# Patient Record
Sex: MALE | Race: WHITE
[De-identification: names, ages, dates, MRNs, and addresses within clinical notes are randomized per-mention and may not be internally consistent; named-entity substitution may affect disease eponyms.]

---

## 2017-03-16 ENCOUNTER — HOSPITAL ENCOUNTER (OUTPATIENT)
Dept: HOSPITAL 8 - CFH | Age: 72
Discharge: HOME | End: 2017-03-16
Attending: INTERNAL MEDICINE
Payer: MEDICARE

## 2017-03-16 DIAGNOSIS — N28.1: Primary | ICD-10-CM

## 2017-03-16 DIAGNOSIS — I10: ICD-10-CM

## 2017-03-16 DIAGNOSIS — N17.9: ICD-10-CM

## 2017-03-16 DIAGNOSIS — R80.3: ICD-10-CM

## 2017-03-16 DIAGNOSIS — N32.0: ICD-10-CM

## 2017-03-16 DIAGNOSIS — I48.2: ICD-10-CM

## 2017-03-16 PROCEDURE — 76770 US EXAM ABDO BACK WALL COMP: CPT

## 2017-03-27 ENCOUNTER — HOSPITAL ENCOUNTER (OUTPATIENT)
Dept: HOSPITAL 8 - CFH | Age: 72
Discharge: HOME | End: 2017-03-27
Attending: FAMILY MEDICINE
Payer: MEDICARE

## 2017-03-27 DIAGNOSIS — I10: ICD-10-CM

## 2017-03-27 DIAGNOSIS — I99.9: Primary | ICD-10-CM

## 2017-03-27 LAB
AST SERPL-CCNC: 22 U/L (ref 15–37)
BUN SERPL-MCNC: 18 MG/DL (ref 7–18)

## 2017-03-27 PROCEDURE — 36415 COLL VENOUS BLD VENIPUNCTURE: CPT

## 2017-03-27 PROCEDURE — 80061 LIPID PANEL: CPT

## 2017-03-27 PROCEDURE — 80053 COMPREHEN METABOLIC PANEL: CPT

## 2017-04-13 ENCOUNTER — HOSPITAL ENCOUNTER (OUTPATIENT)
Dept: HOSPITAL 8 - CFH | Age: 72
Discharge: HOME | End: 2017-04-13
Attending: INTERNAL MEDICINE
Payer: MEDICARE

## 2017-04-13 DIAGNOSIS — I51.7: ICD-10-CM

## 2017-04-13 DIAGNOSIS — I10: ICD-10-CM

## 2017-04-13 DIAGNOSIS — I08.3: Primary | ICD-10-CM

## 2017-04-13 DIAGNOSIS — Z86.73: ICD-10-CM

## 2017-04-13 PROCEDURE — 93306 TTE W/DOPPLER COMPLETE: CPT

## 2017-04-17 ENCOUNTER — HOSPITAL ENCOUNTER (OUTPATIENT)
Dept: HOSPITAL 8 - CFH | Age: 72
Discharge: HOME | End: 2017-04-17
Attending: NURSE PRACTITIONER
Payer: MEDICARE

## 2017-04-17 DIAGNOSIS — I48.2: Primary | ICD-10-CM

## 2017-04-17 DIAGNOSIS — Z79.01: ICD-10-CM

## 2017-04-17 PROCEDURE — 36415 COLL VENOUS BLD VENIPUNCTURE: CPT

## 2017-04-17 PROCEDURE — 85610 PROTHROMBIN TIME: CPT

## 2017-04-18 ENCOUNTER — HOSPITAL ENCOUNTER (OUTPATIENT)
Dept: HOSPITAL 8 - CFH | Age: 72
Discharge: HOME | End: 2017-04-18
Attending: INTERNAL MEDICINE
Payer: MEDICARE

## 2017-04-18 DIAGNOSIS — I48.2: Primary | ICD-10-CM

## 2017-04-18 DIAGNOSIS — I10: ICD-10-CM

## 2017-04-18 LAB — BUN SERPL-MCNC: 30 MG/DL (ref 7–18)

## 2017-04-18 PROCEDURE — 36415 COLL VENOUS BLD VENIPUNCTURE: CPT

## 2017-04-18 PROCEDURE — 80048 BASIC METABOLIC PNL TOTAL CA: CPT

## 2017-04-18 PROCEDURE — 80162 ASSAY OF DIGOXIN TOTAL: CPT

## 2017-05-22 ENCOUNTER — HOSPITAL ENCOUNTER (OUTPATIENT)
Dept: HOSPITAL 8 - LAB | Age: 72
Discharge: HOME | End: 2017-05-22
Attending: NURSE PRACTITIONER
Payer: MEDICARE

## 2017-05-22 DIAGNOSIS — Z79.01: ICD-10-CM

## 2017-05-22 DIAGNOSIS — I48.2: Primary | ICD-10-CM

## 2017-05-22 PROCEDURE — 85610 PROTHROMBIN TIME: CPT

## 2017-05-22 PROCEDURE — 36415 COLL VENOUS BLD VENIPUNCTURE: CPT

## 2017-06-26 ENCOUNTER — HOSPITAL ENCOUNTER (OUTPATIENT)
Dept: HOSPITAL 8 - CFH | Age: 72
Discharge: HOME | End: 2017-06-26
Attending: NURSE PRACTITIONER
Payer: MEDICARE

## 2017-06-26 DIAGNOSIS — I48.2: ICD-10-CM

## 2017-06-26 DIAGNOSIS — Z79.01: ICD-10-CM

## 2017-06-26 DIAGNOSIS — I50.9: Primary | ICD-10-CM

## 2017-06-26 PROCEDURE — 36415 COLL VENOUS BLD VENIPUNCTURE: CPT

## 2017-06-26 PROCEDURE — 85610 PROTHROMBIN TIME: CPT

## 2017-07-31 ENCOUNTER — HOSPITAL ENCOUNTER (OUTPATIENT)
Dept: HOSPITAL 8 - LAB | Age: 72
Discharge: HOME | End: 2017-07-31
Attending: INTERNAL MEDICINE
Payer: MEDICARE

## 2017-07-31 DIAGNOSIS — Z79.01: ICD-10-CM

## 2017-07-31 DIAGNOSIS — I48.2: ICD-10-CM

## 2017-07-31 DIAGNOSIS — I50.9: Primary | ICD-10-CM

## 2017-07-31 PROCEDURE — 36415 COLL VENOUS BLD VENIPUNCTURE: CPT

## 2017-07-31 PROCEDURE — 85610 PROTHROMBIN TIME: CPT

## 2017-08-23 ENCOUNTER — HOSPITAL ENCOUNTER (OUTPATIENT)
Dept: HOSPITAL 8 - LAB | Age: 72
Discharge: HOME | End: 2017-08-23
Attending: INTERNAL MEDICINE
Payer: MEDICARE

## 2017-08-23 DIAGNOSIS — I12.9: ICD-10-CM

## 2017-08-23 DIAGNOSIS — R80.3: ICD-10-CM

## 2017-08-23 DIAGNOSIS — N18.3: ICD-10-CM

## 2017-08-23 DIAGNOSIS — I48.2: Primary | ICD-10-CM

## 2017-08-23 LAB
AST SERPL-CCNC: 16 U/L (ref 15–37)
BUN SERPL-MCNC: 20 MG/DL (ref 7–18)
HCT VFR BLD CALC: 49 % (ref 39.2–51.8)
HGB BLD-MCNC: 16 G/DL (ref 13.7–18)
PATH.CAST-FLAG: (no result)
SPERM-FLAG: (no result)
SRC-FLAG: (no result)
WBC # BLD AUTO: 8.5 X10^3/UL (ref 3.4–10)
XTAL-FLAG: (no result)
YLC-FLAG: (no result)

## 2017-08-23 PROCEDURE — 81001 URINALYSIS AUTO W/SCOPE: CPT

## 2017-08-23 PROCEDURE — 82570 ASSAY OF URINE CREATININE: CPT

## 2017-08-23 PROCEDURE — 36415 COLL VENOUS BLD VENIPUNCTURE: CPT

## 2017-08-23 PROCEDURE — 80053 COMPREHEN METABOLIC PANEL: CPT

## 2017-08-23 PROCEDURE — 85025 COMPLETE CBC W/AUTO DIFF WBC: CPT

## 2017-08-23 PROCEDURE — 84156 ASSAY OF PROTEIN URINE: CPT

## 2017-09-05 ENCOUNTER — HOSPITAL ENCOUNTER (OUTPATIENT)
Dept: HOSPITAL 8 - CFH | Age: 72
Discharge: HOME | End: 2017-09-05
Attending: NURSE PRACTITIONER
Payer: MEDICARE

## 2017-09-05 DIAGNOSIS — I50.9: Primary | ICD-10-CM

## 2017-09-05 DIAGNOSIS — I48.2: ICD-10-CM

## 2017-09-05 DIAGNOSIS — Z79.01: ICD-10-CM

## 2017-09-05 PROCEDURE — 85610 PROTHROMBIN TIME: CPT

## 2017-09-05 PROCEDURE — 36415 COLL VENOUS BLD VENIPUNCTURE: CPT

## 2017-11-27 ENCOUNTER — HOSPITAL ENCOUNTER (OUTPATIENT)
Dept: HOSPITAL 8 - CFH | Age: 72
Discharge: HOME | End: 2017-11-27
Attending: INTERNAL MEDICINE
Payer: MEDICARE

## 2017-11-27 DIAGNOSIS — I48.2: Primary | ICD-10-CM

## 2017-11-27 PROCEDURE — 85610 PROTHROMBIN TIME: CPT

## 2017-11-27 PROCEDURE — 36415 COLL VENOUS BLD VENIPUNCTURE: CPT

## 2018-01-05 ENCOUNTER — HOSPITAL ENCOUNTER (OUTPATIENT)
Dept: HOSPITAL 8 - LAB | Age: 73
End: 2018-01-05
Attending: INTERNAL MEDICINE
Payer: MEDICARE

## 2018-01-05 DIAGNOSIS — I48.2: Primary | ICD-10-CM

## 2018-01-05 LAB
INR PPP: 2.23 (ref 0.93–1.1)
PROTHROMBIN TIME: 22.8 SECONDS (ref 9.6–11.5)

## 2018-01-05 PROCEDURE — 85610 PROTHROMBIN TIME: CPT

## 2018-01-05 PROCEDURE — 36415 COLL VENOUS BLD VENIPUNCTURE: CPT

## 2018-01-24 ENCOUNTER — HOSPITAL ENCOUNTER (OUTPATIENT)
Dept: HOSPITAL 8 - CFH | Age: 73
End: 2018-01-24
Attending: UROLOGY
Payer: MEDICARE

## 2018-01-24 DIAGNOSIS — I12.9: ICD-10-CM

## 2018-01-24 DIAGNOSIS — Z12.5: Primary | ICD-10-CM

## 2018-01-24 DIAGNOSIS — I48.2: ICD-10-CM

## 2018-01-24 DIAGNOSIS — N18.3: ICD-10-CM

## 2018-01-24 LAB
ANION GAP SERPL CALC-SCNC: 5 MMOL/L (ref 5–15)
CALCIUM SERPL-MCNC: 8.7 MG/DL (ref 8.5–10.1)
CHLORIDE SERPL-SCNC: 107 MMOL/L (ref 98–107)
CREAT SERPL-MCNC: 1.25 MG/DL (ref 0.7–1.3)

## 2018-01-24 PROCEDURE — 36415 COLL VENOUS BLD VENIPUNCTURE: CPT

## 2018-01-24 PROCEDURE — 80048 BASIC METABOLIC PNL TOTAL CA: CPT

## 2018-01-24 PROCEDURE — 84153 ASSAY OF PSA TOTAL: CPT

## 2018-02-21 ENCOUNTER — HOSPITAL ENCOUNTER (OUTPATIENT)
Dept: HOSPITAL 8 - CFH | Age: 73
Discharge: HOME | End: 2018-02-21
Attending: INTERNAL MEDICINE
Payer: MEDICARE

## 2018-02-21 DIAGNOSIS — N18.3: ICD-10-CM

## 2018-02-21 DIAGNOSIS — I12.9: Primary | ICD-10-CM

## 2018-02-21 DIAGNOSIS — R80.3: ICD-10-CM

## 2018-02-21 DIAGNOSIS — I48.2: ICD-10-CM

## 2018-02-21 LAB
ALBUMIN SERPL-MCNC: 4 G/DL (ref 3.4–5)
ALP SERPL-CCNC: 127 U/L (ref 45–117)
ALT SERPL-CCNC: 34 U/L (ref 12–78)
ANION GAP SERPL CALC-SCNC: 4 MMOL/L (ref 5–15)
BASOPHILS # BLD AUTO: 0.03 X10^3/UL (ref 0–0.1)
BASOPHILS NFR BLD AUTO: 0 % (ref 0–1)
BILIRUB SERPL-MCNC: 0.6 MG/DL (ref 0.2–1)
CALCIUM SERPL-MCNC: 9.5 MG/DL (ref 8.5–10.1)
CHLORIDE SERPL-SCNC: 104 MMOL/L (ref 98–107)
CREAT SERPL-MCNC: 1.24 MG/DL (ref 0.7–1.3)
CULTURE INDICATED?: NO
EOSINOPHIL # BLD AUTO: 0.3 X10^3/UL (ref 0–0.4)
EOSINOPHIL NFR BLD AUTO: 3 % (ref 1–7)
ERYTHROCYTE [DISTWIDTH] IN BLOOD BY AUTOMATED COUNT: 13.8 % (ref 9.4–14.8)
HBV CORE IGM SERPL QL IA: 44 MG/DL (ref 0–12)
HBV SURFACE AB SER RIA-ACNC: 194 MG/DL
LYMPHOCYTES # BLD AUTO: 2.69 X10^3/UL (ref 1–3.4)
LYMPHOCYTES NFR BLD AUTO: 27 % (ref 22–44)
MCH RBC QN AUTO: 32.5 PG (ref 27.5–34.5)
MCHC RBC AUTO-ENTMCNC: 33.5 G/DL (ref 33.2–36.2)
MCV RBC AUTO: 97 FL (ref 81–97)
MD: NO
MICROSCOPIC: (no result)
MONOCYTES # BLD AUTO: 0.55 X10^3/UL (ref 0.2–0.8)
MONOCYTES NFR BLD AUTO: 6 % (ref 2–9)
NEUTROPHILS # BLD AUTO: 6.38 X10^3/UL (ref 1.8–6.8)
NEUTROPHILS NFR BLD AUTO: 64 % (ref 42–75)
PLATELET # BLD AUTO: 184 X10^3/UL (ref 130–400)
PMV BLD AUTO: 10.9 FL (ref 7.4–10.4)
PROT SERPL-MCNC: 8 G/DL (ref 6.4–8.2)
RBC # BLD AUTO: 5.14 X10^6/UL (ref 4.38–5.82)

## 2018-02-21 PROCEDURE — 83735 ASSAY OF MAGNESIUM: CPT

## 2018-02-21 PROCEDURE — 80053 COMPREHEN METABOLIC PANEL: CPT

## 2018-02-21 PROCEDURE — 81001 URINALYSIS AUTO W/SCOPE: CPT

## 2018-02-21 PROCEDURE — 82570 ASSAY OF URINE CREATININE: CPT

## 2018-02-21 PROCEDURE — 36415 COLL VENOUS BLD VENIPUNCTURE: CPT

## 2018-02-21 PROCEDURE — 85025 COMPLETE CBC W/AUTO DIFF WBC: CPT

## 2018-02-21 PROCEDURE — 84100 ASSAY OF PHOSPHORUS: CPT

## 2018-02-21 PROCEDURE — 84156 ASSAY OF PROTEIN URINE: CPT

## 2018-03-26 ENCOUNTER — HOSPITAL ENCOUNTER (OUTPATIENT)
Dept: HOSPITAL 8 - LAB | Age: 73
Discharge: HOME | End: 2018-03-26
Attending: FAMILY MEDICINE
Payer: MEDICARE

## 2018-03-26 DIAGNOSIS — N18.3: ICD-10-CM

## 2018-03-26 DIAGNOSIS — E78.5: ICD-10-CM

## 2018-03-26 DIAGNOSIS — Z13.1: Primary | ICD-10-CM

## 2018-03-26 LAB
ALBUMIN SERPL-MCNC: 3.7 G/DL (ref 3.4–5)
ALP SERPL-CCNC: 136 U/L (ref 45–117)
ALT SERPL-CCNC: 34 U/L (ref 12–78)
ANION GAP SERPL CALC-SCNC: 8 MMOL/L (ref 5–15)
BILIRUB SERPL-MCNC: 0.7 MG/DL (ref 0.2–1)
CALCIUM SERPL-MCNC: 8.6 MG/DL (ref 8.5–10.1)
CHLORIDE SERPL-SCNC: 106 MMOL/L (ref 98–107)
CHOL/HDL RATIO: 3.3
CREAT SERPL-MCNC: 1.26 MG/DL (ref 0.7–1.3)
EST. AVERAGE GLUCOSE BLD GHB EST-MCNC: 146 MG/DL (ref 0–126)
HBA1C MFR BLD: 6.7 % (ref 4.2–6.3)
HDL CHOL %: 30 % (ref 26–37)
HDL CHOLESTEROL (DIRECT): 28 MG/DL (ref 40–60)
LDL CHOLESTEROL,CALCULATED: 32 MG/DL (ref 54–169)
LDLC/HDLC SERPL: 1.1 {RATIO} (ref 0.5–3)
PROT SERPL-MCNC: 7.9 G/DL (ref 6.4–8.2)
TRIGL SERPL-MCNC: 162 MG/DL (ref 50–200)
VLDLC SERPL CALC-MCNC: 32 MG/DL (ref 0–25)

## 2018-03-26 PROCEDURE — 83036 HEMOGLOBIN GLYCOSYLATED A1C: CPT

## 2018-03-26 PROCEDURE — 80061 LIPID PANEL: CPT

## 2018-03-26 PROCEDURE — 80053 COMPREHEN METABOLIC PANEL: CPT

## 2018-03-26 PROCEDURE — 36415 COLL VENOUS BLD VENIPUNCTURE: CPT

## 2018-04-23 ENCOUNTER — HOSPITAL ENCOUNTER (OUTPATIENT)
Dept: HOSPITAL 8 - CFH | Age: 73
Discharge: HOME | End: 2018-04-23
Attending: INTERNAL MEDICINE
Payer: MEDICARE

## 2018-04-23 DIAGNOSIS — I48.2: Primary | ICD-10-CM

## 2018-04-23 LAB
INR PPP: 2.2 (ref 0.93–1.1)
PROTHROMBIN TIME: 22.3 SECONDS (ref 9.6–11.5)

## 2018-04-23 PROCEDURE — 85610 PROTHROMBIN TIME: CPT

## 2018-04-23 PROCEDURE — 36415 COLL VENOUS BLD VENIPUNCTURE: CPT

## 2018-05-29 ENCOUNTER — HOSPITAL ENCOUNTER (OUTPATIENT)
Dept: HOSPITAL 8 - CFH | Age: 73
End: 2018-05-29
Attending: INTERNAL MEDICINE
Payer: MEDICARE

## 2018-05-29 DIAGNOSIS — I48.2: Primary | ICD-10-CM

## 2018-05-29 LAB
INR PPP: 2.23 (ref 0.93–1.1)
PROTHROMBIN TIME: 22.8 SECONDS (ref 9.6–11.5)

## 2018-05-29 PROCEDURE — 85610 PROTHROMBIN TIME: CPT

## 2018-05-29 PROCEDURE — 36415 COLL VENOUS BLD VENIPUNCTURE: CPT

## 2018-07-09 ENCOUNTER — HOSPITAL ENCOUNTER (OUTPATIENT)
Dept: HOSPITAL 8 - CFH | Age: 73
Discharge: HOME | End: 2018-07-09
Attending: INTERNAL MEDICINE
Payer: MEDICARE

## 2018-07-09 DIAGNOSIS — I48.2: Primary | ICD-10-CM

## 2018-07-09 LAB
INR PPP: 1.83 (ref 0.93–1.1)
PROTHROMBIN TIME: 18.8 SECONDS (ref 9.6–11.5)

## 2018-07-09 PROCEDURE — 36415 COLL VENOUS BLD VENIPUNCTURE: CPT

## 2018-07-09 PROCEDURE — 85610 PROTHROMBIN TIME: CPT

## 2018-08-21 ENCOUNTER — HOSPITAL ENCOUNTER (OUTPATIENT)
Dept: HOSPITAL 8 - CFH | Age: 73
Discharge: HOME | End: 2018-08-21
Attending: INTERNAL MEDICINE
Payer: MEDICARE

## 2018-08-21 DIAGNOSIS — I48.2: Primary | ICD-10-CM

## 2018-08-21 LAB
INR PPP: 1.9 (ref 0.93–1.1)
PROTHROMBIN TIME: 19.3 SECONDS (ref 9.6–11.5)

## 2018-08-21 PROCEDURE — 36415 COLL VENOUS BLD VENIPUNCTURE: CPT

## 2018-08-21 PROCEDURE — 85610 PROTHROMBIN TIME: CPT

## 2018-09-05 ENCOUNTER — HOSPITAL ENCOUNTER (OUTPATIENT)
Dept: HOSPITAL 8 - CFH | Age: 73
Discharge: HOME | End: 2018-09-05
Attending: INTERNAL MEDICINE
Payer: MEDICARE

## 2018-09-05 DIAGNOSIS — I12.9: ICD-10-CM

## 2018-09-05 DIAGNOSIS — R80.9: ICD-10-CM

## 2018-09-05 DIAGNOSIS — N18.3: ICD-10-CM

## 2018-09-05 DIAGNOSIS — I48.91: Primary | ICD-10-CM

## 2018-09-05 LAB
ALBUMIN SERPL-MCNC: 3.9 G/DL (ref 3.4–5)
ALP SERPL-CCNC: 122 U/L (ref 45–117)
ALT SERPL-CCNC: 38 U/L (ref 12–78)
ANION GAP SERPL CALC-SCNC: 9 MMOL/L (ref 5–15)
BASOPHILS # BLD AUTO: 0.05 X10^3/UL (ref 0–0.1)
BASOPHILS NFR BLD AUTO: 1 % (ref 0–1)
BILIRUB SERPL-MCNC: 0.9 MG/DL (ref 0.2–1)
CALCIUM SERPL-MCNC: 9.1 MG/DL (ref 8.5–10.1)
CHLORIDE SERPL-SCNC: 109 MMOL/L (ref 98–107)
CREAT SERPL-MCNC: 1.37 MG/DL (ref 0.7–1.3)
CULTURE INDICATED?: YES
EOSINOPHIL # BLD AUTO: 0.32 X10^3/UL (ref 0–0.4)
EOSINOPHIL NFR BLD AUTO: 3 % (ref 1–7)
ERYTHROCYTE [DISTWIDTH] IN BLOOD BY AUTOMATED COUNT: 14.3 % (ref 9.4–14.8)
HBV CORE IGM SERPL QL IA: 460 MG/DL (ref 0–12)
HBV SURFACE AB SER RIA-ACNC: 283 MG/DL
LYMPHOCYTES # BLD AUTO: 3.15 X10^3/UL (ref 1–3.4)
LYMPHOCYTES NFR BLD AUTO: 30 % (ref 22–44)
MCH RBC QN AUTO: 32.2 PG (ref 27.5–34.5)
MCHC RBC AUTO-ENTMCNC: 32.9 G/DL (ref 33.2–36.2)
MCV RBC AUTO: 97.7 FL (ref 81–97)
MD: NO
MICROSCOPIC: (no result)
MONOCYTES # BLD AUTO: 0.71 X10^3/UL (ref 0.2–0.8)
MONOCYTES NFR BLD AUTO: 7 % (ref 2–9)
NEUTROPHILS # BLD AUTO: 6.38 X10^3/UL (ref 1.8–6.8)
NEUTROPHILS NFR BLD AUTO: 60 % (ref 42–75)
PLATELET # BLD AUTO: 170 X10^3/UL (ref 130–400)
PMV BLD AUTO: 11.3 FL (ref 7.4–10.4)
PROT SERPL-MCNC: 8 G/DL (ref 6.4–8.2)
RBC # BLD AUTO: 4.81 X10^6/UL (ref 4.38–5.82)

## 2018-09-05 PROCEDURE — 82570 ASSAY OF URINE CREATININE: CPT

## 2018-09-05 PROCEDURE — 85025 COMPLETE CBC W/AUTO DIFF WBC: CPT

## 2018-09-05 PROCEDURE — 80053 COMPREHEN METABOLIC PANEL: CPT

## 2018-09-05 PROCEDURE — 36415 COLL VENOUS BLD VENIPUNCTURE: CPT

## 2018-09-05 PROCEDURE — 84156 ASSAY OF PROTEIN URINE: CPT

## 2018-09-05 PROCEDURE — 87086 URINE CULTURE/COLONY COUNT: CPT

## 2018-09-05 PROCEDURE — 87077 CULTURE AEROBIC IDENTIFY: CPT

## 2018-09-05 PROCEDURE — 81001 URINALYSIS AUTO W/SCOPE: CPT

## 2018-09-13 ENCOUNTER — HOSPITAL ENCOUNTER (OUTPATIENT)
Dept: HOSPITAL 8 - CVU | Age: 73
Discharge: HOME | End: 2018-09-13
Attending: INTERNAL MEDICINE
Payer: MEDICARE

## 2018-09-13 DIAGNOSIS — I11.9: ICD-10-CM

## 2018-09-13 DIAGNOSIS — E78.5: ICD-10-CM

## 2018-09-13 DIAGNOSIS — I34.0: Primary | ICD-10-CM

## 2018-09-13 DIAGNOSIS — I63.9: ICD-10-CM

## 2018-09-13 PROCEDURE — 93306 TTE W/DOPPLER COMPLETE: CPT

## 2018-09-24 ENCOUNTER — HOSPITAL ENCOUNTER (OUTPATIENT)
Dept: HOSPITAL 8 - CFH | Age: 73
Discharge: HOME | End: 2018-09-24
Attending: INTERNAL MEDICINE
Payer: MEDICARE

## 2018-09-24 DIAGNOSIS — I48.2: Primary | ICD-10-CM

## 2018-09-24 LAB
INR PPP: 2.48 (ref 0.93–1.1)
PROTHROMBIN TIME: 25.3 SECONDS (ref 9.6–11.5)

## 2018-09-24 PROCEDURE — 36415 COLL VENOUS BLD VENIPUNCTURE: CPT

## 2018-09-24 PROCEDURE — 85610 PROTHROMBIN TIME: CPT

## 2018-10-31 ENCOUNTER — HOSPITAL ENCOUNTER (OUTPATIENT)
Dept: HOSPITAL 8 - CFH | Age: 73
Discharge: HOME | End: 2018-10-31
Attending: INTERNAL MEDICINE
Payer: MEDICARE

## 2018-10-31 DIAGNOSIS — Z01.818: Primary | ICD-10-CM

## 2018-10-31 DIAGNOSIS — I48.2: ICD-10-CM

## 2018-10-31 LAB
INR PPP: 3.58 (ref 0.93–1.1)
PROTHROMBIN TIME: 36 SECONDS (ref 9.6–11.5)

## 2018-10-31 PROCEDURE — 85610 PROTHROMBIN TIME: CPT

## 2018-10-31 PROCEDURE — 36415 COLL VENOUS BLD VENIPUNCTURE: CPT

## 2018-12-05 ENCOUNTER — HOSPITAL ENCOUNTER (OUTPATIENT)
Dept: HOSPITAL 8 - CFH | Age: 73
Discharge: HOME | End: 2018-12-05
Attending: INTERNAL MEDICINE
Payer: MEDICARE

## 2018-12-05 DIAGNOSIS — I48.2: Primary | ICD-10-CM

## 2018-12-05 LAB
INR PPP: 3.09 (ref 0.93–1.1)
PROTHROMBIN TIME: 31.4 SECONDS (ref 9.6–11.5)

## 2018-12-05 PROCEDURE — 85610 PROTHROMBIN TIME: CPT

## 2018-12-05 PROCEDURE — 36415 COLL VENOUS BLD VENIPUNCTURE: CPT

## 2019-01-14 ENCOUNTER — HOSPITAL ENCOUNTER (OUTPATIENT)
Dept: HOSPITAL 8 - CFH | Age: 74
Discharge: HOME | End: 2019-01-14
Attending: INTERNAL MEDICINE
Payer: MEDICARE

## 2019-01-14 DIAGNOSIS — I48.2: Primary | ICD-10-CM

## 2019-01-14 LAB
INR PPP: 2.55 (ref 0.93–1.1)
PROTHROMBIN TIME: 26.1 SECONDS (ref 9.6–11.5)

## 2019-01-14 PROCEDURE — 36415 COLL VENOUS BLD VENIPUNCTURE: CPT

## 2019-01-14 PROCEDURE — 85610 PROTHROMBIN TIME: CPT

## 2019-02-12 ENCOUNTER — HOSPITAL ENCOUNTER (OUTPATIENT)
Dept: HOSPITAL 8 - CFH | Age: 74
Discharge: HOME | End: 2019-02-12
Attending: INTERNAL MEDICINE
Payer: MEDICARE

## 2019-02-12 DIAGNOSIS — Z79.01: ICD-10-CM

## 2019-02-12 DIAGNOSIS — I48.2: Primary | ICD-10-CM

## 2019-02-12 LAB
INR PPP: 2.46 (ref 0.93–1.1)
PROTHROMBIN TIME: 25.2 SECONDS (ref 9.6–11.5)

## 2019-02-12 PROCEDURE — 85610 PROTHROMBIN TIME: CPT

## 2019-02-12 PROCEDURE — 36415 COLL VENOUS BLD VENIPUNCTURE: CPT

## 2019-02-26 ENCOUNTER — HOSPITAL ENCOUNTER (OUTPATIENT)
Dept: HOSPITAL 8 - CFH | Age: 74
Discharge: HOME | End: 2019-02-26
Attending: UROLOGY
Payer: MEDICARE

## 2019-02-26 DIAGNOSIS — N40.1: Primary | ICD-10-CM

## 2019-02-26 LAB
ANION GAP SERPL CALC-SCNC: 6 MMOL/L (ref 5–15)
CALCIUM SERPL-MCNC: 8.5 MG/DL (ref 8.5–10.1)
CHLORIDE SERPL-SCNC: 109 MMOL/L (ref 98–107)
CREAT SERPL-MCNC: 1.3 MG/DL (ref 0.7–1.3)

## 2019-02-26 PROCEDURE — 80048 BASIC METABOLIC PNL TOTAL CA: CPT

## 2019-02-26 PROCEDURE — 36415 COLL VENOUS BLD VENIPUNCTURE: CPT

## 2019-02-26 PROCEDURE — 84153 ASSAY OF PSA TOTAL: CPT

## 2019-03-05 ENCOUNTER — HOSPITAL ENCOUNTER (OUTPATIENT)
Dept: HOSPITAL 8 - CFH | Age: 74
Discharge: HOME | End: 2019-03-05
Attending: INTERNAL MEDICINE
Payer: MEDICARE

## 2019-03-05 DIAGNOSIS — E83.39: ICD-10-CM

## 2019-03-05 DIAGNOSIS — N18.3: Primary | ICD-10-CM

## 2019-03-05 LAB
ALBUMIN SERPL-MCNC: 3.9 G/DL (ref 3.4–5)
ALP SERPL-CCNC: 108 U/L (ref 45–117)
ALT SERPL-CCNC: 32 U/L (ref 12–78)
ANION GAP SERPL CALC-SCNC: 5 MMOL/L (ref 5–15)
BILIRUB SERPL-MCNC: 1.4 MG/DL (ref 0.2–1)
CALCIUM SERPL-MCNC: 8.8 MG/DL (ref 8.5–10.1)
CHLORIDE SERPL-SCNC: 108 MMOL/L (ref 98–107)
CREAT SERPL-MCNC: 1.28 MG/DL (ref 0.7–1.3)
HBV CORE IGM SERPL QL IA: 38 MG/DL (ref 0–12)
HBV SURFACE AB SER RIA-ACNC: 176 MG/DL
MICROSCOPIC: (no result)
PROT SERPL-MCNC: 7.6 G/DL (ref 6.4–8.2)

## 2019-03-05 PROCEDURE — 84156 ASSAY OF PROTEIN URINE: CPT

## 2019-03-05 PROCEDURE — 81001 URINALYSIS AUTO W/SCOPE: CPT

## 2019-03-05 PROCEDURE — 84100 ASSAY OF PHOSPHORUS: CPT

## 2019-03-05 PROCEDURE — 36415 COLL VENOUS BLD VENIPUNCTURE: CPT

## 2019-03-05 PROCEDURE — 82570 ASSAY OF URINE CREATININE: CPT

## 2019-03-05 PROCEDURE — 80053 COMPREHEN METABOLIC PANEL: CPT

## 2019-03-13 ENCOUNTER — HOSPITAL ENCOUNTER (OUTPATIENT)
Dept: HOSPITAL 8 - CFH | Age: 74
Discharge: HOME | End: 2019-03-13
Attending: INTERNAL MEDICINE
Payer: MEDICARE

## 2019-03-13 DIAGNOSIS — E78.5: Primary | ICD-10-CM

## 2019-03-13 DIAGNOSIS — I10: ICD-10-CM

## 2019-03-13 DIAGNOSIS — I48.91: ICD-10-CM

## 2019-03-13 DIAGNOSIS — I48.2: ICD-10-CM

## 2019-03-13 LAB
ALBUMIN SERPL-MCNC: 4.1 G/DL (ref 3.4–5)
ALP SERPL-CCNC: 121 U/L (ref 45–117)
ALT SERPL-CCNC: 31 U/L (ref 12–78)
ANION GAP SERPL CALC-SCNC: 6 MMOL/L (ref 5–15)
BILIRUB SERPL-MCNC: 1 MG/DL (ref 0.2–1)
CALCIUM SERPL-MCNC: 8.7 MG/DL (ref 8.5–10.1)
CHLORIDE SERPL-SCNC: 109 MMOL/L (ref 98–107)
CHOL/HDL RATIO: 3.6
CREAT SERPL-MCNC: 1.35 MG/DL (ref 0.7–1.3)
HDL CHOL %: 28 % (ref 26–37)
HDL CHOLESTEROL (DIRECT): 30 MG/DL (ref 40–60)
INR PPP: 2.3 (ref 0.93–1.1)
LDL CHOLESTEROL,CALCULATED: 45 MG/DL (ref 54–169)
LDLC/HDLC SERPL: 1.5 {RATIO} (ref 0.5–3)
PROT SERPL-MCNC: 7.8 G/DL (ref 6.4–8.2)
PROTHROMBIN TIME: 23.4 SECONDS (ref 9.6–11.5)
TRIGL SERPL-MCNC: 169 MG/DL (ref 50–200)
VLDLC SERPL CALC-MCNC: 34 MG/DL (ref 0–25)

## 2019-03-13 PROCEDURE — 80061 LIPID PANEL: CPT

## 2019-03-13 PROCEDURE — 80162 ASSAY OF DIGOXIN TOTAL: CPT

## 2019-03-13 PROCEDURE — 85610 PROTHROMBIN TIME: CPT

## 2019-03-13 PROCEDURE — 80053 COMPREHEN METABOLIC PANEL: CPT

## 2019-03-13 PROCEDURE — 36415 COLL VENOUS BLD VENIPUNCTURE: CPT

## 2019-03-18 ENCOUNTER — HOSPITAL ENCOUNTER (OUTPATIENT)
Dept: HOSPITAL 8 - CFH | Age: 74
Discharge: HOME | End: 2019-03-18
Attending: INTERNAL MEDICINE
Payer: MEDICARE

## 2019-03-18 DIAGNOSIS — I42.0: Primary | ICD-10-CM

## 2019-03-18 PROCEDURE — 93017 CV STRESS TEST TRACING ONLY: CPT

## 2019-03-18 PROCEDURE — 78452 HT MUSCLE IMAGE SPECT MULT: CPT

## 2019-03-18 PROCEDURE — A9502 TC99M TETROFOSMIN: HCPCS

## 2019-04-26 ENCOUNTER — HOSPITAL ENCOUNTER (OUTPATIENT)
Dept: HOSPITAL 8 - CFH | Age: 74
Discharge: HOME | End: 2019-04-26
Attending: INTERNAL MEDICINE
Payer: MEDICARE

## 2019-04-26 DIAGNOSIS — Z79.01: ICD-10-CM

## 2019-04-26 DIAGNOSIS — I48.2: Primary | ICD-10-CM

## 2019-04-26 LAB
INR PPP: 2.15 (ref 0.93–1.1)
PROTHROMBIN TIME: 21.9 SECONDS (ref 9.6–11.5)

## 2019-04-26 PROCEDURE — 85610 PROTHROMBIN TIME: CPT

## 2019-04-26 PROCEDURE — 36415 COLL VENOUS BLD VENIPUNCTURE: CPT

## 2019-05-09 ENCOUNTER — HOSPITAL ENCOUNTER (EMERGENCY)
Dept: HOSPITAL 8 - ED | Age: 74
Discharge: HOME | End: 2019-05-09
Payer: MEDICARE

## 2019-05-09 VITALS — DIASTOLIC BLOOD PRESSURE: 83 MMHG | SYSTOLIC BLOOD PRESSURE: 160 MMHG

## 2019-05-09 VITALS — BODY MASS INDEX: 24.03 KG/M2 | WEIGHT: 162.26 LBS | HEIGHT: 69 IN

## 2019-05-09 DIAGNOSIS — I48.91: ICD-10-CM

## 2019-05-09 DIAGNOSIS — I10: ICD-10-CM

## 2019-05-09 DIAGNOSIS — R21: Primary | ICD-10-CM

## 2019-05-09 PROCEDURE — 99281 EMR DPT VST MAYX REQ PHY/QHP: CPT

## 2019-05-28 DIAGNOSIS — I48.2: Primary | ICD-10-CM

## 2019-05-28 DIAGNOSIS — Z79.01: ICD-10-CM

## 2019-05-28 LAB
INR PPP: 2.78 (ref 0.93–1.1)
PROTHROMBIN TIME: 28.1 SECONDS (ref 9.6–11.5)

## 2019-05-28 PROCEDURE — 85610 PROTHROMBIN TIME: CPT

## 2019-05-28 PROCEDURE — 36415 COLL VENOUS BLD VENIPUNCTURE: CPT

## 2019-07-10 ENCOUNTER — HOSPITAL ENCOUNTER (OUTPATIENT)
Dept: HOSPITAL 8 - CFH | Age: 74
Discharge: HOME | End: 2019-07-10
Attending: INTERNAL MEDICINE
Payer: MEDICARE

## 2019-07-10 DIAGNOSIS — I48.2: Primary | ICD-10-CM

## 2019-07-10 DIAGNOSIS — Z79.01: ICD-10-CM

## 2019-07-10 LAB
INR PPP: 2.26 (ref 0.93–1.1)
PROTHROMBIN TIME: 23 SECONDS (ref 9.6–11.5)

## 2019-07-10 PROCEDURE — 85610 PROTHROMBIN TIME: CPT

## 2019-07-10 PROCEDURE — 36415 COLL VENOUS BLD VENIPUNCTURE: CPT

## 2019-10-23 ENCOUNTER — HOSPITAL ENCOUNTER (OUTPATIENT)
Dept: HOSPITAL 8 - CFH | Age: 74
Discharge: HOME | End: 2019-10-23
Attending: INTERNAL MEDICINE
Payer: MEDICARE

## 2019-10-23 DIAGNOSIS — I48.20: Primary | ICD-10-CM

## 2019-10-23 DIAGNOSIS — Z79.01: ICD-10-CM

## 2019-10-23 LAB
INR PPP: 2.24 (ref 0.93–1.1)
PROTHROMBIN TIME: 22.8 SECONDS (ref 9.6–11.5)

## 2019-10-23 PROCEDURE — 85610 PROTHROMBIN TIME: CPT

## 2019-10-23 PROCEDURE — 36415 COLL VENOUS BLD VENIPUNCTURE: CPT

## 2019-10-24 ENCOUNTER — HOSPITAL ENCOUNTER (OUTPATIENT)
Dept: HOSPITAL 8 - STAR | Age: 74
Discharge: HOME | End: 2019-10-24
Attending: SURGERY
Payer: MEDICARE

## 2019-10-24 DIAGNOSIS — Z01.818: Primary | ICD-10-CM

## 2019-10-24 DIAGNOSIS — R94.31: ICD-10-CM

## 2019-10-24 DIAGNOSIS — K40.91: ICD-10-CM

## 2019-10-24 LAB
ALBUMIN SERPL-MCNC: 3.8 G/DL (ref 3.4–5)
ALP SERPL-CCNC: 95 U/L (ref 45–117)
ALT SERPL-CCNC: 33 U/L (ref 12–78)
ANION GAP SERPL CALC-SCNC: 6 MMOL/L (ref 5–15)
APTT BLD: 36 SECONDS (ref 25–31)
BASOPHILS # BLD AUTO: 0.03 X10^3/UL (ref 0–0.1)
BASOPHILS NFR BLD AUTO: 0 % (ref 0–1)
BILIRUB SERPL-MCNC: 1.2 MG/DL (ref 0.2–1)
CALCIUM SERPL-MCNC: 8.4 MG/DL (ref 8.5–10.1)
CHLORIDE SERPL-SCNC: 109 MMOL/L (ref 98–107)
CREAT SERPL-MCNC: 1.18 MG/DL (ref 0.7–1.3)
EOSINOPHIL # BLD AUTO: 0.29 X10^3/UL (ref 0–0.4)
EOSINOPHIL NFR BLD AUTO: 3 % (ref 1–7)
ERYTHROCYTE [DISTWIDTH] IN BLOOD BY AUTOMATED COUNT: 13.7 % (ref 9.4–14.8)
INR PPP: 2.48 (ref 0.93–1.1)
LYMPHOCYTES # BLD AUTO: 2.39 X10^3/UL (ref 1–3.4)
LYMPHOCYTES NFR BLD AUTO: 27 % (ref 22–44)
MCH RBC QN AUTO: 33.2 PG (ref 27.5–34.5)
MCHC RBC AUTO-ENTMCNC: 33.1 G/DL (ref 33.2–36.2)
MCV RBC AUTO: 100.3 FL (ref 81–97)
MD: NO
MONOCYTES # BLD AUTO: 0.49 X10^3/UL (ref 0.2–0.8)
MONOCYTES NFR BLD AUTO: 6 % (ref 2–9)
NEUTROPHILS # BLD AUTO: 5.57 X10^3/UL (ref 1.8–6.8)
NEUTROPHILS NFR BLD AUTO: 64 % (ref 42–75)
PLATELET # BLD AUTO: 157 X10^3/UL (ref 130–400)
PMV BLD AUTO: 9.5 FL (ref 7.4–10.4)
PROT SERPL-MCNC: 7.5 G/DL (ref 6.4–8.2)
PROTHROMBIN TIME: 25.1 SECONDS (ref 9.6–11.5)
RBC # BLD AUTO: 4.63 X10^6/UL (ref 4.38–5.82)

## 2019-10-24 PROCEDURE — 85730 THROMBOPLASTIN TIME PARTIAL: CPT

## 2019-10-24 PROCEDURE — 36415 COLL VENOUS BLD VENIPUNCTURE: CPT

## 2019-10-24 PROCEDURE — 80053 COMPREHEN METABOLIC PANEL: CPT

## 2019-10-24 PROCEDURE — 93005 ELECTROCARDIOGRAM TRACING: CPT

## 2019-10-24 PROCEDURE — 85610 PROTHROMBIN TIME: CPT

## 2019-10-24 PROCEDURE — 85025 COMPLETE CBC W/AUTO DIFF WBC: CPT

## 2019-11-01 ENCOUNTER — HOSPITAL ENCOUNTER (OUTPATIENT)
Dept: HOSPITAL 8 - OUT | Age: 74
Discharge: HOME | End: 2019-11-01
Attending: SURGERY
Payer: MEDICARE

## 2019-11-01 VITALS — HEIGHT: 68 IN | BODY MASS INDEX: 24.39 KG/M2 | WEIGHT: 160.94 LBS

## 2019-11-01 VITALS — DIASTOLIC BLOOD PRESSURE: 81 MMHG | SYSTOLIC BLOOD PRESSURE: 150 MMHG

## 2019-11-01 DIAGNOSIS — Z79.01: ICD-10-CM

## 2019-11-01 DIAGNOSIS — I10: ICD-10-CM

## 2019-11-01 DIAGNOSIS — Z80.1: ICD-10-CM

## 2019-11-01 DIAGNOSIS — I48.91: ICD-10-CM

## 2019-11-01 DIAGNOSIS — K40.91: Primary | ICD-10-CM

## 2019-11-01 DIAGNOSIS — Z98.890: ICD-10-CM

## 2019-11-01 DIAGNOSIS — Z79.899: ICD-10-CM

## 2019-11-01 DIAGNOSIS — Z86.73: ICD-10-CM

## 2019-11-01 DIAGNOSIS — I42.9: ICD-10-CM

## 2019-11-01 LAB
INR PPP: 1.33 (ref 0.93–1.1)
PROTHROMBIN TIME: 13.8 SECONDS (ref 9.6–11.5)

## 2019-11-01 PROCEDURE — 49651 LAP ING HERNIA REPAIR RECUR: CPT

## 2019-11-01 PROCEDURE — C1781 MESH (IMPLANTABLE): HCPCS

## 2019-11-01 PROCEDURE — C1727 CATH, BAL TIS DIS, NON-VAS: HCPCS

## 2019-11-01 PROCEDURE — 85610 PROTHROMBIN TIME: CPT

## 2019-11-01 PROCEDURE — 00840 ANES IPER PX LOWER ABD NOS: CPT

## 2019-11-01 PROCEDURE — 36415 COLL VENOUS BLD VENIPUNCTURE: CPT

## 2019-11-12 ENCOUNTER — HOSPITAL ENCOUNTER (OUTPATIENT)
Dept: HOSPITAL 8 - CFH | Age: 74
Discharge: HOME | End: 2019-11-12
Attending: INTERNAL MEDICINE
Payer: MEDICARE

## 2019-11-12 DIAGNOSIS — I48.20: Primary | ICD-10-CM

## 2019-11-12 DIAGNOSIS — Z79.01: ICD-10-CM

## 2019-11-12 LAB
INR PPP: 1.61 (ref 0.93–1.1)
PROTHROMBIN TIME: 16.6 SECONDS (ref 9.6–11.5)

## 2019-11-12 PROCEDURE — 85610 PROTHROMBIN TIME: CPT

## 2019-11-12 PROCEDURE — 36415 COLL VENOUS BLD VENIPUNCTURE: CPT

## 2019-11-25 ENCOUNTER — HOSPITAL ENCOUNTER (OUTPATIENT)
Dept: HOSPITAL 8 - CFH | Age: 74
Discharge: HOME | End: 2019-11-25
Attending: INTERNAL MEDICINE
Payer: MEDICARE

## 2019-11-25 DIAGNOSIS — N18.3: ICD-10-CM

## 2019-11-25 DIAGNOSIS — N39.0: Primary | ICD-10-CM

## 2019-11-25 DIAGNOSIS — E83.39: ICD-10-CM

## 2019-11-25 LAB
ALBUMIN SERPL-MCNC: 4.2 G/DL (ref 3.4–5)
ALP SERPL-CCNC: 115 U/L (ref 45–117)
ALT SERPL-CCNC: 46 U/L (ref 12–78)
ANION GAP SERPL CALC-SCNC: 4 MMOL/L (ref 5–15)
BASOPHILS # BLD AUTO: 0.03 X10^3/UL (ref 0–0.1)
BASOPHILS NFR BLD AUTO: 0 % (ref 0–1)
BILIRUB SERPL-MCNC: 1.5 MG/DL (ref 0.2–1)
CALCIUM SERPL-MCNC: 8.8 MG/DL (ref 8.5–10.1)
CHLORIDE SERPL-SCNC: 108 MMOL/L (ref 98–107)
CREAT SERPL-MCNC: 1.33 MG/DL (ref 0.7–1.3)
EOSINOPHIL # BLD AUTO: 0.36 X10^3/UL (ref 0–0.4)
EOSINOPHIL NFR BLD AUTO: 4 % (ref 1–7)
ERYTHROCYTE [DISTWIDTH] IN BLOOD BY AUTOMATED COUNT: 13.8 % (ref 9.4–14.8)
HBV CORE IGM SERPL QL IA: 30 MG/DL (ref 0–12)
HBV SURFACE AB SER RIA-ACNC: 125 MG/DL
LYMPHOCYTES # BLD AUTO: 2.48 X10^3/UL (ref 1–3.4)
LYMPHOCYTES NFR BLD AUTO: 26 % (ref 22–44)
MCH RBC QN AUTO: 32.8 PG (ref 27.5–34.5)
MCHC RBC AUTO-ENTMCNC: 32.6 G/DL (ref 33.2–36.2)
MCV RBC AUTO: 100.8 FL (ref 81–97)
MD: NO
MICROSCOPIC: (no result)
MONOCYTES # BLD AUTO: 0.42 X10^3/UL (ref 0.2–0.8)
MONOCYTES NFR BLD AUTO: 4 % (ref 2–9)
NEUTROPHILS # BLD AUTO: 6.38 X10^3/UL (ref 1.8–6.8)
NEUTROPHILS NFR BLD AUTO: 66 % (ref 42–75)
PLATELET # BLD AUTO: 187 X10^3/UL (ref 130–400)
PMV BLD AUTO: 9.9 FL (ref 7.4–10.4)
PROT SERPL-MCNC: 8 G/DL (ref 6.4–8.2)
RBC # BLD AUTO: 5.07 X10^6/UL (ref 4.38–5.82)

## 2019-11-25 PROCEDURE — 85025 COMPLETE CBC W/AUTO DIFF WBC: CPT

## 2019-11-25 PROCEDURE — 81001 URINALYSIS AUTO W/SCOPE: CPT

## 2019-11-25 PROCEDURE — 80053 COMPREHEN METABOLIC PANEL: CPT

## 2019-11-25 PROCEDURE — 36415 COLL VENOUS BLD VENIPUNCTURE: CPT

## 2019-11-25 PROCEDURE — 87086 URINE CULTURE/COLONY COUNT: CPT

## 2019-11-25 PROCEDURE — 84156 ASSAY OF PROTEIN URINE: CPT

## 2019-11-25 PROCEDURE — 84100 ASSAY OF PHOSPHORUS: CPT

## 2019-11-25 PROCEDURE — 82570 ASSAY OF URINE CREATININE: CPT

## 2019-12-23 ENCOUNTER — HOSPITAL ENCOUNTER (OUTPATIENT)
Dept: HOSPITAL 8 - CFH | Age: 74
Discharge: HOME | End: 2019-12-23
Attending: INTERNAL MEDICINE
Payer: MEDICARE

## 2019-12-23 DIAGNOSIS — I48.20: Primary | ICD-10-CM

## 2019-12-23 DIAGNOSIS — Z79.01: ICD-10-CM

## 2019-12-23 DIAGNOSIS — I13.10: ICD-10-CM

## 2019-12-23 DIAGNOSIS — N18.3: ICD-10-CM

## 2019-12-23 LAB
INR PPP: 2.51 (ref 0.93–1.1)
PROTHROMBIN TIME: 25.4 SECONDS (ref 9.6–11.5)

## 2019-12-23 PROCEDURE — 85610 PROTHROMBIN TIME: CPT

## 2019-12-23 PROCEDURE — 36415 COLL VENOUS BLD VENIPUNCTURE: CPT

## 2020-01-29 ENCOUNTER — HOSPITAL ENCOUNTER (OUTPATIENT)
Dept: HOSPITAL 8 - CFH | Age: 75
Discharge: HOME | End: 2020-01-29
Attending: INTERNAL MEDICINE
Payer: MEDICARE

## 2020-01-29 DIAGNOSIS — Z79.01: ICD-10-CM

## 2020-01-29 DIAGNOSIS — I48.20: Primary | ICD-10-CM

## 2020-01-29 LAB
INR PPP: 1.98 (ref 0.93–1.1)
PROTHROMBIN TIME: 21.1 SECONDS (ref 9.6–11.5)

## 2020-01-29 PROCEDURE — 36415 COLL VENOUS BLD VENIPUNCTURE: CPT

## 2020-01-29 PROCEDURE — 85610 PROTHROMBIN TIME: CPT

## 2020-02-21 ENCOUNTER — HOSPITAL ENCOUNTER (OUTPATIENT)
Dept: HOSPITAL 8 - CFH | Age: 75
Discharge: HOME | End: 2020-02-21
Attending: INTERNAL MEDICINE
Payer: MEDICARE

## 2020-02-21 DIAGNOSIS — I08.1: Primary | ICD-10-CM

## 2020-02-21 PROCEDURE — 93306 TTE W/DOPPLER COMPLETE: CPT

## 2020-02-27 ENCOUNTER — HOSPITAL ENCOUNTER (OUTPATIENT)
Dept: HOSPITAL 8 - CFH | Age: 75
Discharge: HOME | End: 2020-02-27
Attending: INTERNAL MEDICINE
Payer: MEDICARE

## 2020-02-27 DIAGNOSIS — I48.20: Primary | ICD-10-CM

## 2020-02-27 DIAGNOSIS — Z79.01: ICD-10-CM

## 2020-02-27 LAB
INR PPP: 3.11 (ref 0.93–1.1)
PROTHROMBIN TIME: 33.4 SECONDS (ref 9.6–11.5)

## 2020-02-27 PROCEDURE — 36415 COLL VENOUS BLD VENIPUNCTURE: CPT

## 2020-02-27 PROCEDURE — 85610 PROTHROMBIN TIME: CPT

## 2020-04-01 ENCOUNTER — HOSPITAL ENCOUNTER (OUTPATIENT)
Dept: HOSPITAL 8 - CFH | Age: 75
Discharge: HOME | End: 2020-04-01
Attending: INTERNAL MEDICINE
Payer: MEDICARE

## 2020-04-01 DIAGNOSIS — Z79.01: ICD-10-CM

## 2020-04-01 DIAGNOSIS — I48.20: Primary | ICD-10-CM

## 2020-04-01 LAB
INR PPP: 2.27 (ref 0.93–1.1)
PROTHROMBIN TIME: 24.3 SECONDS (ref 9.6–11.5)

## 2020-04-01 PROCEDURE — 85610 PROTHROMBIN TIME: CPT

## 2020-04-01 PROCEDURE — 36415 COLL VENOUS BLD VENIPUNCTURE: CPT

## 2020-05-11 ENCOUNTER — HOSPITAL ENCOUNTER (OUTPATIENT)
Dept: HOSPITAL 8 - CFH | Age: 75
Discharge: HOME | End: 2020-05-11
Attending: INTERNAL MEDICINE
Payer: MEDICARE

## 2020-05-11 DIAGNOSIS — Z79.01: ICD-10-CM

## 2020-05-11 DIAGNOSIS — I48.20: Primary | ICD-10-CM

## 2020-05-11 LAB
INR PPP: 2.34 (ref 0.93–1.1)
PROTHROMBIN TIME: 25 SECONDS (ref 9.6–11.5)

## 2020-05-11 PROCEDURE — 85610 PROTHROMBIN TIME: CPT

## 2020-05-11 PROCEDURE — 36415 COLL VENOUS BLD VENIPUNCTURE: CPT

## 2020-05-26 ENCOUNTER — HOSPITAL ENCOUNTER (OUTPATIENT)
Dept: HOSPITAL 8 - CFH | Age: 75
Discharge: HOME | End: 2020-05-26
Attending: UROLOGY
Payer: MEDICARE

## 2020-05-26 DIAGNOSIS — N40.1: ICD-10-CM

## 2020-05-26 DIAGNOSIS — Z12.5: Primary | ICD-10-CM

## 2020-05-26 LAB
ANION GAP SERPL CALC-SCNC: 6 MMOL/L (ref 5–15)
CALCIUM SERPL-MCNC: 8.9 MG/DL (ref 8.5–10.1)
CHLORIDE SERPL-SCNC: 107 MMOL/L (ref 98–107)
CREAT SERPL-MCNC: 1.3 MG/DL (ref 0.7–1.3)
PSA SCREEN: 2.46 NG/ML (ref 0–4)

## 2020-05-26 PROCEDURE — 36415 COLL VENOUS BLD VENIPUNCTURE: CPT

## 2020-05-26 PROCEDURE — 80048 BASIC METABOLIC PNL TOTAL CA: CPT

## 2020-06-08 ENCOUNTER — HOSPITAL ENCOUNTER (OUTPATIENT)
Dept: LAB | Facility: MEDICAL CENTER | Age: 75
End: 2020-06-08
Attending: UROLOGY
Payer: MEDICARE

## 2020-06-08 PROCEDURE — 87086 URINE CULTURE/COLONY COUNT: CPT

## 2020-06-11 LAB
BACTERIA UR CULT: NORMAL
SIGNIFICANT IND 70042: NORMAL
SITE SITE: NORMAL
SOURCE SOURCE: NORMAL

## 2020-07-01 ENCOUNTER — HOSPITAL ENCOUNTER (OUTPATIENT)
Dept: HOSPITAL 8 - CFH | Age: 75
Discharge: HOME | End: 2020-07-01
Attending: INTERNAL MEDICINE
Payer: MEDICAID

## 2020-07-01 DIAGNOSIS — Z79.01: ICD-10-CM

## 2020-07-01 DIAGNOSIS — I48.20: Primary | ICD-10-CM

## 2020-07-01 LAB
INR PPP: 2.85 (ref 0.93–1.1)
PROTHROMBIN TIME: 30.5 SECONDS (ref 9.6–11.5)

## 2020-07-01 PROCEDURE — 36415 COLL VENOUS BLD VENIPUNCTURE: CPT

## 2020-07-01 PROCEDURE — 85610 PROTHROMBIN TIME: CPT

## 2020-08-03 ENCOUNTER — OFFICE VISIT (OUTPATIENT)
Dept: ADMISSIONS | Facility: MEDICAL CENTER | Age: 75
End: 2020-08-03
Attending: SURGERY
Payer: MEDICARE

## 2020-08-03 DIAGNOSIS — Z01.812 PRE-OPERATIVE LABORATORY EXAMINATION: ICD-10-CM

## 2020-08-03 DIAGNOSIS — Z01.810 PRE-OPERATIVE CARDIOVASCULAR EXAMINATION: ICD-10-CM

## 2020-08-03 LAB
COVID ORDER STATUS COVID19: NORMAL
EKG IMPRESSION: NORMAL
ERYTHROCYTE [DISTWIDTH] IN BLOOD BY AUTOMATED COUNT: 45.4 FL (ref 35.9–50)
HCT VFR BLD AUTO: 48.8 % (ref 42–52)
HGB BLD-MCNC: 16.3 G/DL (ref 14–18)
MCH RBC QN AUTO: 32.5 PG (ref 27–33)
MCHC RBC AUTO-ENTMCNC: 33.4 G/DL (ref 33.7–35.3)
MCV RBC AUTO: 97.2 FL (ref 81.4–97.8)
PLATELET # BLD AUTO: 159 K/UL (ref 164–446)
PMV BLD AUTO: 11.1 FL (ref 9–12.9)
RBC # BLD AUTO: 5.02 M/UL (ref 4.7–6.1)
SARS-COV-2 RNA RESP QL NAA+PROBE: NOTDETECTED
SPECIMEN SOURCE: NORMAL
WBC # BLD AUTO: 8.1 K/UL (ref 4.8–10.8)

## 2020-08-03 PROCEDURE — U0003 INFECTIOUS AGENT DETECTION BY NUCLEIC ACID (DNA OR RNA); SEVERE ACUTE RESPIRATORY SYNDROME CORONAVIRUS 2 (SARS-COV-2) (CORONAVIRUS DISEASE [COVID-19]), AMPLIFIED PROBE TECHNIQUE, MAKING USE OF HIGH THROUGHPUT TECHNOLOGIES AS DESCRIBED BY CMS-2020-01-R: HCPCS

## 2020-08-03 PROCEDURE — 36415 COLL VENOUS BLD VENIPUNCTURE: CPT

## 2020-08-03 PROCEDURE — 93010 ELECTROCARDIOGRAM REPORT: CPT | Performed by: INTERNAL MEDICINE

## 2020-08-03 PROCEDURE — 93005 ELECTROCARDIOGRAM TRACING: CPT

## 2020-08-03 PROCEDURE — 85027 COMPLETE CBC AUTOMATED: CPT

## 2020-08-03 RX ORDER — LISINOPRIL 5 MG/1
5 TABLET ORAL
COMMUNITY

## 2020-08-03 RX ORDER — WARFARIN SODIUM 2.5 MG/1
2.5 TABLET ORAL DAILY
COMMUNITY

## 2020-08-03 RX ORDER — ATORVASTATIN CALCIUM 40 MG/1
40 TABLET, FILM COATED ORAL NIGHTLY
COMMUNITY
End: 2023-09-06

## 2020-08-03 RX ORDER — TAMSULOSIN HYDROCHLORIDE 0.4 MG/1
0.4 CAPSULE ORAL
COMMUNITY

## 2020-08-06 ENCOUNTER — HOSPITAL ENCOUNTER (OUTPATIENT)
Facility: MEDICAL CENTER | Age: 75
End: 2020-08-06
Attending: SURGERY | Admitting: SURGERY
Payer: MEDICARE

## 2020-08-06 ENCOUNTER — ANESTHESIA EVENT (OUTPATIENT)
Dept: SURGERY | Facility: MEDICAL CENTER | Age: 75
End: 2020-08-06
Payer: MEDICARE

## 2020-08-06 ENCOUNTER — ANESTHESIA (OUTPATIENT)
Dept: SURGERY | Facility: MEDICAL CENTER | Age: 75
End: 2020-08-06
Payer: MEDICARE

## 2020-08-06 VITALS
HEART RATE: 68 BPM | TEMPERATURE: 97.1 F | RESPIRATION RATE: 15 BRPM | WEIGHT: 168.43 LBS | SYSTOLIC BLOOD PRESSURE: 138 MMHG | OXYGEN SATURATION: 96 % | HEIGHT: 68 IN | DIASTOLIC BLOOD PRESSURE: 75 MMHG | BODY MASS INDEX: 25.53 KG/M2

## 2020-08-06 DIAGNOSIS — K40.90 INGUINAL HERNIA OF RIGHT SIDE WITHOUT OBSTRUCTION OR GANGRENE: Primary | ICD-10-CM

## 2020-08-06 DIAGNOSIS — G89.18 POSTOPERATIVE PAIN: ICD-10-CM

## 2020-08-06 LAB
INR PPP: 1.14 (ref 0.87–1.13)
PROTHROMBIN TIME: 15 SEC (ref 12–14.6)

## 2020-08-06 PROCEDURE — 160041 HCHG SURGERY MINUTES - EA ADDL 1 MIN LEVEL 4: Performed by: SURGERY

## 2020-08-06 PROCEDURE — 501582 HCHG TROCAR, THRD BLADED: Performed by: SURGERY

## 2020-08-06 PROCEDURE — 501838 HCHG SUTURE GENERAL: Performed by: SURGERY

## 2020-08-06 PROCEDURE — 160048 HCHG OR STATISTICAL LEVEL 1-5: Performed by: SURGERY

## 2020-08-06 PROCEDURE — 502240 HCHG MISC OR SUPPLY RC 0272: Performed by: SURGERY

## 2020-08-06 PROCEDURE — 501399 HCHG SPECIMAN BAG, ENDO CATC: Performed by: SURGERY

## 2020-08-06 PROCEDURE — 501568 HCHG TROCAR, BLUNTPORT 12MM: Performed by: SURGERY

## 2020-08-06 PROCEDURE — 160046 HCHG PACU - 1ST 60 MINS PHASE II: Performed by: SURGERY

## 2020-08-06 PROCEDURE — 500002 HCHG ADHESIVE, DERMABOND: Performed by: SURGERY

## 2020-08-06 PROCEDURE — 160035 HCHG PACU - 1ST 60 MINS PHASE I: Performed by: SURGERY

## 2020-08-06 PROCEDURE — 160029 HCHG SURGERY MINUTES - 1ST 30 MINS LEVEL 4: Performed by: SURGERY

## 2020-08-06 PROCEDURE — C1781 MESH (IMPLANTABLE): HCPCS | Performed by: SURGERY

## 2020-08-06 PROCEDURE — 502571 HCHG PACK, LAP CHOLE: Performed by: SURGERY

## 2020-08-06 PROCEDURE — 700111 HCHG RX REV CODE 636 W/ 250 OVERRIDE (IP): Performed by: ANESTHESIOLOGY

## 2020-08-06 PROCEDURE — 160009 HCHG ANES TIME/MIN: Performed by: SURGERY

## 2020-08-06 PROCEDURE — 700101 HCHG RX REV CODE 250: Performed by: SURGERY

## 2020-08-06 PROCEDURE — 700102 HCHG RX REV CODE 250 W/ 637 OVERRIDE(OP): Performed by: SURGERY

## 2020-08-06 PROCEDURE — A9270 NON-COVERED ITEM OR SERVICE: HCPCS | Performed by: SURGERY

## 2020-08-06 PROCEDURE — 85610 PROTHROMBIN TIME: CPT

## 2020-08-06 PROCEDURE — 160002 HCHG RECOVERY MINUTES (STAT): Performed by: SURGERY

## 2020-08-06 PROCEDURE — 700101 HCHG RX REV CODE 250: Performed by: ANESTHESIOLOGY

## 2020-08-06 PROCEDURE — 160025 RECOVERY II MINUTES (STATS): Performed by: SURGERY

## 2020-08-06 PROCEDURE — A9270 NON-COVERED ITEM OR SERVICE: HCPCS | Performed by: ANESTHESIOLOGY

## 2020-08-06 PROCEDURE — 700102 HCHG RX REV CODE 250 W/ 637 OVERRIDE(OP): Performed by: ANESTHESIOLOGY

## 2020-08-06 PROCEDURE — 700105 HCHG RX REV CODE 258: Performed by: SURGERY

## 2020-08-06 DEVICE — MESH PROGRIP LAPROSCOPIC SELF FIXATING (1/CA): Type: IMPLANTABLE DEVICE | Site: GROIN | Status: FUNCTIONAL

## 2020-08-06 RX ORDER — DEXAMETHASONE SODIUM PHOSPHATE 4 MG/ML
INJECTION, SOLUTION INTRA-ARTICULAR; INTRALESIONAL; INTRAMUSCULAR; INTRAVENOUS; SOFT TISSUE PRN
Status: DISCONTINUED | OUTPATIENT
Start: 2020-08-06 | End: 2020-08-06 | Stop reason: SURG

## 2020-08-06 RX ORDER — ROCURONIUM BROMIDE 10 MG/ML
INJECTION, SOLUTION INTRAVENOUS PRN
Status: DISCONTINUED | OUTPATIENT
Start: 2020-08-06 | End: 2020-08-06 | Stop reason: SURG

## 2020-08-06 RX ORDER — ONDANSETRON 4 MG/1
4 TABLET, FILM COATED ORAL EVERY 4 HOURS PRN
Qty: 20 TAB | Refills: 3 | Status: SHIPPED | OUTPATIENT
Start: 2020-08-06 | End: 2022-10-03

## 2020-08-06 RX ORDER — HYDRALAZINE HYDROCHLORIDE 20 MG/ML
5 INJECTION INTRAMUSCULAR; INTRAVENOUS
Status: DISCONTINUED | OUTPATIENT
Start: 2020-08-06 | End: 2020-08-06 | Stop reason: HOSPADM

## 2020-08-06 RX ORDER — OXYCODONE HCL 5 MG/5 ML
5 SOLUTION, ORAL ORAL
Status: COMPLETED | OUTPATIENT
Start: 2020-08-06 | End: 2020-08-06

## 2020-08-06 RX ORDER — SODIUM CHLORIDE, SODIUM LACTATE, POTASSIUM CHLORIDE, CALCIUM CHLORIDE 600; 310; 30; 20 MG/100ML; MG/100ML; MG/100ML; MG/100ML
INJECTION, SOLUTION INTRAVENOUS CONTINUOUS
Status: DISCONTINUED | OUTPATIENT
Start: 2020-08-06 | End: 2020-08-06 | Stop reason: HOSPADM

## 2020-08-06 RX ORDER — HYDROMORPHONE HYDROCHLORIDE 1 MG/ML
0.5 INJECTION, SOLUTION INTRAMUSCULAR; INTRAVENOUS; SUBCUTANEOUS
Status: DISCONTINUED | OUTPATIENT
Start: 2020-08-06 | End: 2020-08-06 | Stop reason: HOSPADM

## 2020-08-06 RX ORDER — BUPIVACAINE HYDROCHLORIDE AND EPINEPHRINE 5; 5 MG/ML; UG/ML
INJECTION, SOLUTION EPIDURAL; INTRACAUDAL; PERINEURAL
Status: DISCONTINUED | OUTPATIENT
Start: 2020-08-06 | End: 2020-08-06 | Stop reason: HOSPADM

## 2020-08-06 RX ORDER — LABETALOL HYDROCHLORIDE 5 MG/ML
5 INJECTION, SOLUTION INTRAVENOUS
Status: DISCONTINUED | OUTPATIENT
Start: 2020-08-06 | End: 2020-08-06 | Stop reason: HOSPADM

## 2020-08-06 RX ORDER — OXYCODONE HCL 5 MG/5 ML
10 SOLUTION, ORAL ORAL
Status: COMPLETED | OUTPATIENT
Start: 2020-08-06 | End: 2020-08-06

## 2020-08-06 RX ORDER — HYDROMORPHONE HYDROCHLORIDE 1 MG/ML
0.4 INJECTION, SOLUTION INTRAMUSCULAR; INTRAVENOUS; SUBCUTANEOUS
Status: DISCONTINUED | OUTPATIENT
Start: 2020-08-06 | End: 2020-08-06 | Stop reason: HOSPADM

## 2020-08-06 RX ORDER — DOCUSATE SODIUM 100 MG/1
100 CAPSULE, LIQUID FILLED ORAL 2 TIMES DAILY
Qty: 15 CAP | Refills: 3 | Status: SHIPPED | OUTPATIENT
Start: 2020-08-06 | End: 2022-10-03

## 2020-08-06 RX ORDER — HYDROMORPHONE HYDROCHLORIDE 1 MG/ML
0.2 INJECTION, SOLUTION INTRAMUSCULAR; INTRAVENOUS; SUBCUTANEOUS
Status: DISCONTINUED | OUTPATIENT
Start: 2020-08-06 | End: 2020-08-06 | Stop reason: HOSPADM

## 2020-08-06 RX ORDER — LIDOCAINE HYDROCHLORIDE 20 MG/ML
INJECTION, SOLUTION EPIDURAL; INFILTRATION; INTRACAUDAL; PERINEURAL PRN
Status: DISCONTINUED | OUTPATIENT
Start: 2020-08-06 | End: 2020-08-06 | Stop reason: SURG

## 2020-08-06 RX ORDER — HYDROCODONE BITARTRATE AND ACETAMINOPHEN 5; 325 MG/1; MG/1
1-2 TABLET ORAL EVERY 8 HOURS PRN
Qty: 18 TAB | Refills: 0 | Status: SHIPPED | OUTPATIENT
Start: 2020-08-06 | End: 2020-08-09

## 2020-08-06 RX ORDER — CEFAZOLIN SODIUM 1 G/3ML
INJECTION, POWDER, FOR SOLUTION INTRAMUSCULAR; INTRAVENOUS PRN
Status: DISCONTINUED | OUTPATIENT
Start: 2020-08-06 | End: 2020-08-06 | Stop reason: HOSPADM

## 2020-08-06 RX ADMIN — OXYCODONE HYDROCHLORIDE 5 MG: 5 SOLUTION ORAL at 09:05

## 2020-08-06 RX ADMIN — FENTANYL CITRATE 50 MCG: 50 INJECTION INTRAMUSCULAR; INTRAVENOUS at 08:04

## 2020-08-06 RX ADMIN — DEXAMETHASONE SODIUM PHOSPHATE 4 MG: 4 INJECTION, SOLUTION INTRA-ARTICULAR; INTRALESIONAL; INTRAMUSCULAR; INTRAVENOUS; SOFT TISSUE at 08:09

## 2020-08-06 RX ADMIN — CEFAZOLIN 2 G: 330 INJECTION, POWDER, FOR SOLUTION INTRAMUSCULAR; INTRAVENOUS at 08:09

## 2020-08-06 RX ADMIN — POVIDONE-IODINE 15 ML: 10 SOLUTION TOPICAL at 06:44

## 2020-08-06 RX ADMIN — LIDOCAINE HYDROCHLORIDE 0.5 ML: 10 INJECTION, SOLUTION EPIDURAL; INFILTRATION; INTRACAUDAL at 06:45

## 2020-08-06 RX ADMIN — SODIUM CHLORIDE, POTASSIUM CHLORIDE, SODIUM LACTATE AND CALCIUM CHLORIDE: 600; 310; 30; 20 INJECTION, SOLUTION INTRAVENOUS at 06:45

## 2020-08-06 RX ADMIN — LIDOCAINE HYDROCHLORIDE 60 MG: 20 INJECTION, SOLUTION EPIDURAL; INFILTRATION; INTRACAUDAL at 08:04

## 2020-08-06 RX ADMIN — ROCURONIUM BROMIDE 50 MG: 10 INJECTION, SOLUTION INTRAVENOUS at 08:05

## 2020-08-06 RX ADMIN — PROPOFOL 100 MG: 10 INJECTION, EMULSION INTRAVENOUS at 08:04

## 2020-08-06 RX ADMIN — FENTANYL CITRATE 50 MCG: 50 INJECTION INTRAMUSCULAR; INTRAVENOUS at 09:04

## 2020-08-06 NOTE — OR NURSING
The pt is awake and oriented. Respirations are regular and easy. Pain is controlled, the pt is comfortable.Incisions dry and intact.

## 2020-08-06 NOTE — PROGRESS NOTES
Inguinal Hernia Repair Discharge Instructions:    1. DIET: You may resume your normal preoperative diet.    2. ACTIVITIES: You may resume routine activities. Heavy lifting (over 15 pounds) and strenuous activities should be avoided for one month after surgery. Routine activities of daily living such as walking, going up and down stairs are acceptable.    3. DRIVING: You may drive whenever you are no longer taking narcotic pain medications and are able to perform the activities needed to drive safely, i.e. turning, bending, twisting, etc.    4. BATHING: You have waterproof glue covering your incisions, so you can shower anytime but not baths for 1 week after surgery. The glue will peel off after a few days.      5. BOWEL FUNCTION: Constipation is common after surgery. If you feel this is occurring, take a laxative (Milk of Magnesia, Ex-Lax, Senokot, Miralax, Magnesium Citrate) until the problem has resolved.    6. PAIN MEDICATION: You will be given a prescription for pain medication. Please take these as directed. You may also use Advil/Motrin/Ibuprofen to help reduce pain in addition to your pain medication. It is important to remember not to take medications on an empty stomach as this may cause nausea. Do not consume alcohol while taking pain medication.  You can put ice packs on the groin(s) if it helps for additional pain relief.  Ice should be applied for 20 minutes at a time, with a 20 minute break before reapplying.    7. WHAT TO EXPECT: You may develop swelling and bruising at the base of your genitalia and within the scrotum (males) or labias (females). This is common and may take several weeks to resolve.      8. CALL IF YOU HAVE: (1) Fevers to more than 101.5F, (2) Unusual chest or leg pain, (3) Drainage or fluid from incision that may be foul smelling, increased tenderness or soreness at the wound or the wound edges are no longer together, redness or swelling at the incision site.  (4) Profound swelling  at the incision site or in the genitals.    9. FOLLOW-UP: Call and schedule a follow up appointment in 2 weeks.  If you have any additional questions at all, please do not hesitate to call the office and speak to my medical assistant, myself, or the physician on call.    Brad Camarillo MD  [facility]  [address] [place] [address], [address], [place] [address]  [phone]

## 2020-08-06 NOTE — ANESTHESIA TIME REPORT
Anesthesia Start and Stop Event Times     Date Time Event    8/6/2020 0758 Ready for Procedure     0758 Anesthesia Start     0854 Anesthesia Stop        Responsible Staff  08/06/20    Name Role Begin End    Chuy Mendoza M.D. Anesth 0758 0854        Preop Diagnosis (Free Text):  Pre-op Diagnosis     HIRSCHSPRUNGS DISEASE, POST SURGICAL STATUS        Preop Diagnosis (Codes):    Post op Diagnosis  Right inguinal hernia      Premium Reason  Non-Premium    Comments:

## 2020-08-06 NOTE — DISCHARGE INSTRUCTIONS
ACTIVITY: Rest and take it easy for the first 24 hours.  A responsible adult is recommended to remain with you during that time.  It is normal to feel sleepy.  We encourage you to not do anything that requires balance, judgment or coordination.    MILD FLU-LIKE SYMPTOMS ARE NORMAL. YOU MAY EXPERIENCE GENERALIZED MUSCLE ACHES, THROAT IRRITATION, HEADACHE AND/OR SOME NAUSEA.    FOR 24 HOURS DO NOT:  Drive, operate machinery or run household appliances.  Drink beer or alcoholic beverages.   Make important decisions or sign legal documents.    SPECIAL INSTRUCTIONS: See instruction page from Dr. Camarillo    DIET: To avoid nausea, slowly advance diet as tolerated, avoiding spicy or greasy foods for the first day.  Add more substantial food to your diet according to your physician's instructions. INCREASE FLUIDS AND FIBER TO AVOID CONSTIPATION.    SURGICAL DRESSING/BATHING: You have waterproof glue covering your incisions, so you can shower anytime but not baths for 1 week after surgery. The glue will peel off after a few days.    FOLLOW-UP APPOINTMENT:  A follow-up appointment should be arranged with your doctor in 2 weeks, call to schedule ([phone]).    You should CALL YOUR PHYSICIAN if you develop:  Fever greater than 101 degrees F.  Pain not relieved by medication, or persistent nausea or vomiting.  Excessive bleeding (blood soaking through dressing) or unexpected drainage from the wound.  Extreme redness or swelling around the incision site, drainage of pus or foul smelling drainage.  Inability to urinate or empty your bladder within 8 hours.  Problems with breathing or chest pain.    You should call 911 if you develop problems with breathing or chest pain.  If you are unable to contact your doctor or surgical center, you should go to the nearest emergency room or urgent care center.  Physician's telephone #: [phone].    If any questions arise, call your doctor.  If your doctor is not available, please feel  free to call the Surgical Center at [phone].  The Center is open Monday through Friday from 7AM to 7PM.  You can also call the HEALTH HOTLINE open 24 hours/day, 7 days/week and speak to a nurse at [phone], or toll free at [phone].    A registered nurse may call you a few days after your surgery to see how you are doing after your procedure.    MEDICATIONS: Resume taking daily medication.  Take prescribed pain medication with food.  If no medication is prescribed, you may take non-aspirin pain medication if needed.  PAIN MEDICATION CAN BE VERY CONSTIPATING.  Take a stool softener or laxative such as senokot, pericolace, or milk of magnesia if needed.    Prescription given for Norco, Zofran, and Colace..  Last pain medication given at 09:05 am.    If your physician has prescribed pain medication that includes Acetaminophen (Tylenol), do not take additional Acetaminophen (Tylenol) while taking the prescribed medication.    Depression / Suicide Risk    As you are discharged from this [facility] facility, it is important to learn how to keep safe from harming yourself.    Recognize the warning signs:  · Abrupt changes in personality, positive or negative- including increase in energy   · Giving away possessions  · Change in eating patterns- significant weight changes-  positive or negative  · Change in sleeping patterns- unable to sleep or sleeping all the time   · Unwillingness or inability to communicate  · Depression  · Unusual sadness, discouragement and loneliness  · Talk of wanting to die  · Neglect of personal appearance   · Rebelliousness- reckless behavior  · Withdrawal from people/activities they love  · Confusion- inability to concentrate     If you or a loved one observes any of these behaviors or has concerns about self-harm, here's what you can do:  · Talk about it- your feelings and reasons for harming yourself  · Remove any means that you might use to hurt yourself (examples: pills,  rope, extension cords, firearm)  · Get professional help from the community (Mental Health, Substance Abuse, psychological counseling)  · Do not be alone:Call your Safe Contact- someone whom you trust who will be there for you.  · Call your local CRISIS HOTLINE 160-7420 or 831-399-6201  · Call your local Children's Mobile Crisis Response Team [place] (772) 765-1583 or www.[employer]  · Call the toll free National Suicide Prevention Hotlines   · National Suicide Prevention Lifeline 773-140-AERD (0058)  · National Hope Line Network 800-SUICIDE (366-8107)

## 2020-08-06 NOTE — OP REPORT
Operative Report     Date:    8/6/2020    Pre-operative Diagnosis:  Reducible right inguinal hernia     Post-operative Diagnosis: Reducible right inguinal hernia      Procedure:   Laparoscopic Right Inguinal Hernia Repair with Mesh.      Surgeon:   Brad Camarillo M.D.      Assistant:    Rafael TUCKER        Anesthesia:   General plus local 0.5% Marcaine with epinephrine        Blood Loss:   Minimal    Specimen:   None    Findings:   small indirect hernia      Laparoscopic Progrip mesh for the repair    Wound classification: Clean    Disposition:   PACU in stable condition  ---------------------------------------------------------    History:  75 y.o. male evaluated in the clinic and found to have a reducible right inguinal hernia that was causing him frequent pain.  I had an extensive conversation with the patient regarding the recommendation for surgery.  I explained the details of the operation, alternatives, and potential risks, including but not limited to bleeding, infection, injury to vessels or nerves, injury to organs or intestines, and risks of anesthesia.  All questions were answered. They understand and agree to proceed.  Informed consent was obtained.    PROCEDURE:  The patient voluntarily voided their urinary bladder just prior to being brought back to the OR.  The patient was taken back to the operating room and placed in supine position.  General endotracheal anesthesia was administered and the patient was intubated. Intravenous antibiotics were administered by the anesthesiologist in correct time interval. Sequential compression devices were placed. All bony prominences were protected.  The abdomen was prepped and draped in a sterile fashion.  A time-out was performed and the patient and procedure were both verified.      Marcaine 0.5% with epinephrine was used to infiltrate all port sites. A 2cm transverse incision was made below the umbilicus.  The subcutaneous tissues were spread bluntly to  expose the fascia.  The anterior fascia was open sharply along the entire length of the incision to expose the right rectus muscular bundle.  This muscular bundle was swept laterally to expose the posterior fascia.  A 10mm dissecting balloon was then guided through the incision, along the pre-peritoneal space, and down to the pubic symphysis.  The balloon was then hand-pump inflated under direct visualization.  The balloon was deflated and removed.  A 12mm Zamorano port was placed through the incision, the balloon was inflated, and the collar was cinched down to the skin level to secure the port.  Gas was applied to the port and insufflation was achieved.  A 10mm laparoscope was placed through the port.  There was no evidence of vascular injury or injury to the peritoneum.  Dissection by the dissecting balloon was adequate.    Two 5mm ports were placed in the lower midline between the umbilicus and the pubic symphysis under direct visualization.  Dissection was begun on the right side.  The epigastric vascular bundle was identified in its usual location.  The loose areolar tissue was swept down laterally to free the peritoneum.  Dissection continued medially until the cord structures were identified.  The cord was carefully dissected free from surrounding attachments.  The vas deferens was identified and protected.  The femoral, direct, and indirect spaces were all examined and the peritoneum was swept down posteriorly.  The area of dissection was noted to be hemostatic. Pre-peritoneal fat was cleared off the pubic symphysis and Delvis's ligament.    At this point a laparoscopic progrip mesh was introduced into the pre-peritoneal space through the Zamorano port.  It was laid out flat and care was taken to cover the femoral, direct, and indirect spaces entirely.  The mesh overlapped the midline by 1cm.    The gas was released slowly and the mesh appeared to lay in the pre-peritoneal space without wrinkles or folds.  The  Zamorano port was removed.  The zain-umbilical port site fascia was closed with an 0 vicryl suture.  The fascia was noted to be tight and well approximated.  All remaining ports were then removed.  All skin incisions were closed with interrupted 4-0 Vicryl subcuticular sutures and dressed with skin glue.     The patient tolerated the procedure well and there were no immediate apparent complications. All sponge, sharps, and instrument counts were correct on 2 separate occasions. The patient was awakened, extubated, and transferred to the PACU in satisfactory condition.     Brad Camarillo MD  General and Vascular Surgery  [facility]  Cell: [phone]

## 2020-08-06 NOTE — ANESTHESIA POSTPROCEDURE EVALUATION
Patient: Savlatore Bowen    Procedure Summary     Date: 08/06/20 Room / Location: [facility] 08 / SURGERY [facility]    Anesthesia Start: 0758 Anesthesia Stop: 0854    Procedure: REPAIR, HERNIA, INGUINAL, LAPAROSCOPIC (Right Groin) Diagnosis: (HIRSCHSPRUNGS DISEASE, POST SURGICAL STATUS)    Surgeon: Brad Camarillo M.D. Responsible Provider: Chuy Mendoza M.D.    Anesthesia Type: general ASA Status: 3          Final Anesthesia Type: general  Last vitals  BP   Blood Pressure : 138/75    Temp   36.2 °C (97.1 °F)    Pulse   Pulse: 68   Resp   15    SpO2   96 %      Anesthesia Post Evaluation    Patient location during evaluation: PACU  Patient participation: complete - patient participated  Level of consciousness: sleepy but conscious    Airway patency: patent  Anesthetic complications: no  Cardiovascular status: hemodynamically stable  Respiratory status: acceptable and face mask  Hydration status: balanced    PONV: none           Nurse Pain Score: 3 (NPRS)

## 2020-08-06 NOTE — ANESTHESIA PREPROCEDURE EVALUATION
"    Relevant Problems   NEURO   (+) TIA (transient ischemic attack)      CARDIAC   (+) Atrial fibrillation (HCC)   (+) Hypertension      Other   (+) Chest pain     Anes H&P:  PAST MEDICAL HISTORY:   75 y.o. male who presents for Procedure(s) (LRB):  REPAIR, HERNIA, INGUINAL, LAPAROSCOPIC (Right).  He has current and past medical problems significant for:    Past Medical History:   Diagnosis Date   • Gastric ulcer 2012   • Hypertension 08/03/2020   • Seizure disorder (HCC) 1983    r/t stroke in 1983   • Stroke (HCC) 1983    no residual weakness       SMOKING/ALCOHOL/RECREATIONAL DRUG USE:  Social History     Tobacco Use   • Smoking status: Never Smoker   • Smokeless tobacco: Never Used   Substance Use Topics   • Alcohol use: Yes     Comment: occ   • Drug use: Yes     Types: Inhaled     Comment: methamphetamin (last used 4 yrs ago)     Social History     Substance and Sexual Activity   Drug Use Yes   • Types: Inhaled    Comment: methamphetamin (last used 4 yrs ago)       PAST SURGICAL HISTORY:  Past Surgical History:   Procedure Laterality Date   • OTHER ABDOMINAL SURGERY  2019    hernia   • OTHER NEUROLOGICAL SURG  1983    \"Put a plate in my skull r/t stroke\"       ALLERGIES:   No Known Allergies    VITALS:  Patient Vitals for the past 24 hrs:   BP Temp Temp src Pulse Resp SpO2 Height Weight   08/06/20 0613 120/72 36.6 °C (97.9 °F) Temporal 76 16 96 % 1.727 m (5' 8\") 76.4 kg (168 lb 6.9 oz)       MEDICATIONS:  No current facility-administered medications on file prior to encounter.      Current Outpatient Medications on File Prior to Encounter   Medication Sig Dispense Refill   • metoprolol (LOPRESSOR) 25 MG TABS Take 1 Tab by mouth 2 Times a Day. 60 Tab 0       LABS:  Lab Results   Component Value Date/Time    HEMOGLOBIN 16.3 08/03/2020 1003    HEMATOCRIT 48.8 08/03/2020 1003    WBC 8.1 08/03/2020 1003     Lab Results   Component Value Date/Time    SODIUM 135 10/07/2013 1520    POTASSIUM 4.2 10/07/2013 1520    " CHLORIDE 103 10/07/2013 1520    CO2 24 10/07/2013 1520    GLUCOSE 121 (H) 10/07/2013 1520    BUN 18 10/07/2013 1520    CALCIUM 9.7 10/07/2013 1520       SARS-CoV2 result: Negative      PREVIOUS ANESTHETICS: See EMR  __________________________________________      Physical Exam    Airway   Mallampati: III  TM distance: >3 FB  Neck ROM: full       Cardiovascular - normal exam  Rhythm: regular  Rate: normal  (-) murmur     Dental - normal exam      Very poor dentition   Pulmonary - normal exam  Breath sounds clear to auscultation     Abdominal   (-) obese  Abdomen: soft     Neurological - normal exam                 Anesthesia Plan    ASA 3   ASA physical status 3 criteria: hypertension - poorly controlled and MI or angina - history (> 3 months)    Plan - general       Airway plan will be ETT        Induction: intravenous    Postoperative Plan: Postoperative administration of opioids is intended.    Pertinent diagnostic labs and testing reviewed    Informed Consent:    Anesthetic plan and risks discussed with patient.    Use of blood products discussed with: patient whom consented to blood products.

## 2020-08-06 NOTE — ANESTHESIA PROCEDURE NOTES
Airway    Date/Time: 8/6/2020 8:06 AM  Performed by: Chuy Mendoza M.D.  Authorized by: Chuy Mendoza M.D.     Location:  OR  Urgency:  Elective  Difficult Airway: No    Indications for Airway Management:  Anesthesia      Spontaneous Ventilation: absent    Sedation Level:  Deep  Preoxygenated: Yes    Patient Position:  Sniffing  Mask Difficulty Assessment:  1 - vent by mask  Final Airway Type:  Endotracheal airway  Final Endotracheal Airway:  ETT  Cuffed: Yes    Technique Used for Successful ETT Placement:  Direct laryngoscopy    Insertion Site:  Oral  Blade Type:  Chambers  Laryngoscope Blade/Videolaryngoscope Blade Size:  2  ETT Size (mm):  7.5  Measured from:  Teeth  ETT to Teeth (cm):  23  Placement Verified by: auscultation and capnometry    Cormack-Lehane Classification:  Grade I - full view of glottis  Number of Attempts at Approach:  1

## 2020-08-06 NOTE — PROGRESS NOTES
0931- Patient arrived in PACU II alert and oriented. Vital signs are within normal limits for the patient. Patient reports pain 3/10. Incisions are clean, dry, and intact. Discussed discharge plan of care and patient expressed understanding. All needs met at this time.    0945- Provided patient with discharge education at bedside. All questions answered.    1000- Patient feels ready to be discharged and mets discharge criteria set by Dr. Camarillo. Patient is going to get dressed.    1010- PIV removed and patient discharged home via wheelchair.

## 2020-08-13 ENCOUNTER — HOSPITAL ENCOUNTER (OUTPATIENT)
Dept: HOSPITAL 8 - CFH | Age: 75
Discharge: HOME | End: 2020-08-13
Attending: INTERNAL MEDICINE
Payer: MEDICARE

## 2020-08-13 DIAGNOSIS — I48.20: Primary | ICD-10-CM

## 2020-08-13 DIAGNOSIS — Z79.01: ICD-10-CM

## 2020-08-13 LAB
INR PPP: 1.74 (ref 0.93–1.1)
PROTHROMBIN TIME: 18 SECONDS (ref 9.6–11.5)

## 2020-08-13 PROCEDURE — 85610 PROTHROMBIN TIME: CPT

## 2020-08-13 PROCEDURE — 36415 COLL VENOUS BLD VENIPUNCTURE: CPT

## 2020-08-25 ENCOUNTER — HOSPITAL ENCOUNTER (OUTPATIENT)
Dept: HOSPITAL 8 - CFH | Age: 75
Discharge: HOME | End: 2020-08-25
Attending: INTERNAL MEDICINE
Payer: MEDICARE

## 2020-08-25 DIAGNOSIS — I48.20: Primary | ICD-10-CM

## 2020-08-25 DIAGNOSIS — Z79.01: ICD-10-CM

## 2020-08-25 LAB
INR PPP: 2.84 (ref 0.93–1.1)
PROTHROMBIN TIME: 29.6 SECONDS (ref 9.6–11.5)

## 2020-08-25 PROCEDURE — 36415 COLL VENOUS BLD VENIPUNCTURE: CPT

## 2020-08-25 PROCEDURE — 85610 PROTHROMBIN TIME: CPT

## 2021-01-15 DIAGNOSIS — Z23 NEED FOR VACCINATION: ICD-10-CM

## 2021-08-26 ENCOUNTER — HOSPITAL ENCOUNTER (OUTPATIENT)
Dept: HOSPITAL 8 - LAB | Age: 76
Discharge: HOME | End: 2021-08-26
Attending: INTERNAL MEDICINE
Payer: MEDICARE

## 2021-08-26 DIAGNOSIS — R80.9: ICD-10-CM

## 2021-08-26 DIAGNOSIS — E55.9: ICD-10-CM

## 2021-08-26 DIAGNOSIS — N18.30: ICD-10-CM

## 2021-08-26 DIAGNOSIS — I48.91: ICD-10-CM

## 2021-08-26 DIAGNOSIS — R73.9: ICD-10-CM

## 2021-08-26 DIAGNOSIS — I12.9: Primary | ICD-10-CM

## 2021-08-26 LAB
ALBUMIN SERPL-MCNC: 3.9 G/DL (ref 3.4–5)
ALP SERPL-CCNC: 107 U/L (ref 45–117)
ALT SERPL-CCNC: 39 U/L (ref 12–78)
ANION GAP SERPL CALC-SCNC: 7 MMOL/L (ref 5–15)
BASOPHILS # BLD AUTO: 0 X10^3/UL (ref 0–0.1)
BASOPHILS NFR BLD AUTO: 0 % (ref 0–1)
BILIRUB SERPL-MCNC: 0.9 MG/DL (ref 0.2–1)
CALCIUM SERPL-MCNC: 8.6 MG/DL (ref 8.5–10.1)
CALCIUM SERPL-MCNC: 9.1 MG/DL (ref 8.5–10.1)
CHLORIDE SERPL-SCNC: 108 MMOL/L (ref 98–107)
CREAT SERPL-MCNC: 1.31 MG/DL (ref 0.7–1.3)
EOSINOPHIL # BLD AUTO: 0.2 X10^3/UL (ref 0–0.4)
EOSINOPHIL NFR BLD AUTO: 2 % (ref 1–7)
ERYTHROCYTE [DISTWIDTH] IN BLOOD BY AUTOMATED COUNT: 13.4 % (ref 9.4–14.8)
EST. AVERAGE GLUCOSE BLD GHB EST-MCNC: 120 MG/DL (ref 0–126)
HBV CORE IGM SERPL QL IA: 29 MG/DL (ref 0–12)
HBV SURFACE AB SER RIA-ACNC: 200 MG/DL
LYMPHOCYTES # BLD AUTO: 2.2 X10^3/UL (ref 1–3.4)
LYMPHOCYTES NFR BLD AUTO: 25 % (ref 22–44)
MCH RBC QN AUTO: 33.2 PG (ref 27.5–34.5)
MCHC RBC AUTO-ENTMCNC: 34.1 G/DL (ref 33.2–36.2)
MICROSCOPIC: (no result)
MONOCYTES # BLD AUTO: 0.5 X10^3/UL (ref 0.2–0.8)
MONOCYTES NFR BLD AUTO: 6 % (ref 2–9)
NEUTROPHILS # BLD AUTO: 5.8 X10^3/UL (ref 1.8–6.8)
NEUTROPHILS NFR BLD AUTO: 66 % (ref 42–75)
PLATELET # BLD AUTO: 147 X10^3/UL (ref 130–400)
PMV BLD AUTO: 9.7 FL (ref 7.4–10.4)
PROT SERPL-MCNC: 7.9 G/DL (ref 6.4–8.2)
RBC # BLD AUTO: 4.83 X10^6/UL (ref 4.38–5.82)

## 2021-08-26 PROCEDURE — 36415 COLL VENOUS BLD VENIPUNCTURE: CPT

## 2021-08-26 PROCEDURE — 82310 ASSAY OF CALCIUM: CPT

## 2021-08-26 PROCEDURE — 82306 VITAMIN D 25 HYDROXY: CPT

## 2021-08-26 PROCEDURE — 82043 UR ALBUMIN QUANTITATIVE: CPT

## 2021-08-26 PROCEDURE — 83970 ASSAY OF PARATHORMONE: CPT

## 2021-08-26 PROCEDURE — 84100 ASSAY OF PHOSPHORUS: CPT

## 2021-08-26 PROCEDURE — 84156 ASSAY OF PROTEIN URINE: CPT

## 2021-08-26 PROCEDURE — 85025 COMPLETE CBC W/AUTO DIFF WBC: CPT

## 2021-08-26 PROCEDURE — 83735 ASSAY OF MAGNESIUM: CPT

## 2021-08-26 PROCEDURE — 80053 COMPREHEN METABOLIC PANEL: CPT

## 2021-08-26 PROCEDURE — 83036 HEMOGLOBIN GLYCOSYLATED A1C: CPT

## 2021-08-26 PROCEDURE — 81001 URINALYSIS AUTO W/SCOPE: CPT

## 2021-08-26 PROCEDURE — 82570 ASSAY OF URINE CREATININE: CPT

## 2021-09-23 ENCOUNTER — HOSPITAL ENCOUNTER (OUTPATIENT)
Dept: HOSPITAL 8 - LAB | Age: 76
Discharge: HOME | End: 2021-09-23
Attending: INTERNAL MEDICINE
Payer: MEDICARE

## 2021-09-23 DIAGNOSIS — R17: ICD-10-CM

## 2021-09-23 DIAGNOSIS — N52.9: ICD-10-CM

## 2021-09-23 DIAGNOSIS — I12.9: Primary | ICD-10-CM

## 2021-09-23 DIAGNOSIS — E78.5: ICD-10-CM

## 2021-09-23 DIAGNOSIS — E78.2: ICD-10-CM

## 2021-09-23 DIAGNOSIS — E11.9: ICD-10-CM

## 2021-09-23 DIAGNOSIS — I48.91: ICD-10-CM

## 2021-09-23 DIAGNOSIS — E11.22: ICD-10-CM

## 2021-09-23 DIAGNOSIS — I42.0: ICD-10-CM

## 2021-09-23 DIAGNOSIS — N18.9: ICD-10-CM

## 2021-09-23 DIAGNOSIS — I48.20: ICD-10-CM

## 2021-09-23 LAB
INR PPP: 2.06 (ref 0.93–1.1)
PROTHROMBIN TIME: 21.3 SECONDS (ref 9.6–11.5)

## 2021-09-23 PROCEDURE — 85610 PROTHROMBIN TIME: CPT

## 2021-09-23 PROCEDURE — 36415 COLL VENOUS BLD VENIPUNCTURE: CPT

## 2021-10-28 ENCOUNTER — OFFICE VISIT (OUTPATIENT)
Dept: MEDICAL GROUP | Facility: CLINIC | Age: 76
End: 2021-10-28
Payer: MEDICARE

## 2021-10-28 VITALS
RESPIRATION RATE: 18 BRPM | HEIGHT: 68 IN | OXYGEN SATURATION: 97 % | TEMPERATURE: 97.7 F | WEIGHT: 160 LBS | DIASTOLIC BLOOD PRESSURE: 73 MMHG | SYSTOLIC BLOOD PRESSURE: 111 MMHG | HEART RATE: 77 BPM | BODY MASS INDEX: 24.25 KG/M2

## 2021-10-28 DIAGNOSIS — I48.91 ATRIAL FIBRILLATION, UNSPECIFIED TYPE (HCC): ICD-10-CM

## 2021-10-28 DIAGNOSIS — E11.9 TYPE 2 DIABETES MELLITUS WITHOUT COMPLICATION, WITHOUT LONG-TERM CURRENT USE OF INSULIN (HCC): ICD-10-CM

## 2021-10-28 PROCEDURE — 99213 OFFICE O/P EST LOW 20 MIN: CPT | Mod: GC

## 2021-10-28 RX ORDER — LINAGLIPTIN 5 MG/1
5 TABLET, FILM COATED ORAL DAILY
Qty: 90 TABLET | Refills: 4 | Status: SHIPPED | OUTPATIENT
Start: 2021-10-28 | End: 2024-03-11 | Stop reason: SDUPTHER

## 2021-10-28 NOTE — PROGRESS NOTES
"Subjective:     CC: Medication refill    HPI:   Salvatore presents today for a medication refill. He has continued to follow his diet as discussed with his RD. His A1c from 2 months ago has dropped again to 5.7%. He reports that he is very happy with the work that he has been doing. Denies any numbness tingling in his feet or any signs of nerve pain.    Problem   DM (Diabetes Mellitus) (Hcc)    A1c from 8/21 of 5.7%.  On Tradjenta 5 mg daily     Atrial Fibrillation (Hcc)       Current Outpatient Medications Ordered in Epic   Medication Sig Dispense Refill   • linagliptin (TRADJENTA) 5 MG Tab tablet Take 1 Tablet by mouth every day. 90 Tablet 4   • ondansetron (ZOFRAN) 4 MG Tab tablet Take 1 Tab by mouth every four hours as needed for Nausea/Vomiting. 20 Tab 3   • docusate sodium (COLACE) 100 MG Cap Take 1 Cap by mouth 2 times a day. 15 Cap 3   • warfarin (COUMADIN) 2.5 MG Tab Take 2.5 mg by mouth every day.     • DIGOXIN PO Take  by mouth every bedtime.     • lisinopril (PRINIVIL) 5 MG Tab Take 5 mg by mouth every bedtime.     • tamsulosin (FLOMAX) 0.4 MG capsule Take 0.4 mg by mouth every bedtime.     • atorvastatin (LIPITOR) 40 MG Tab Take 40 mg by mouth every evening.     • metoprolol (LOPRESSOR) 25 MG TABS Take 1 Tab by mouth 2 Times a Day. 60 Tab 0     No current Epic-ordered facility-administered medications on file.       Health Maintenance: Completed    ROS:  Review of Systems   All other systems reviewed and are negative.      Objective:     Exam:  /73 (BP Location: Left arm, Patient Position: Sitting, BP Cuff Size: Adult)   Pulse 77   Temp 36.5 °C (97.7 °F) (Tympanic)   Resp 18   Ht 1.727 m (5' 8\")   Wt 72.6 kg (160 lb)   SpO2 97%   BMI 24.33 kg/m²  Body mass index is 24.33 kg/m².    Physical Exam  Vitals reviewed.   Constitutional:       Appearance: Normal appearance. He is normal weight.   HENT:      Head: Normocephalic and atraumatic.      Right Ear: External ear normal.      Left Ear: " External ear normal.      Nose: Nose normal.      Mouth/Throat:      Mouth: Mucous membranes are moist.      Pharynx: Oropharynx is clear.   Eyes:      Extraocular Movements: Extraocular movements intact.      Conjunctiva/sclera: Conjunctivae normal.      Pupils: Pupils are equal, round, and reactive to light.   Cardiovascular:      Rate and Rhythm: Normal rate and regular rhythm.      Pulses: Normal pulses.      Heart sounds: Normal heart sounds.   Pulmonary:      Effort: Pulmonary effort is normal.      Breath sounds: Normal breath sounds.   Abdominal:      General: Abdomen is flat.      Palpations: Abdomen is soft.   Musculoskeletal:         General: Normal range of motion.      Cervical back: Normal range of motion.   Skin:     General: Skin is warm and dry.   Neurological:      General: No focal deficit present.      Mental Status: He is alert and oriented to person, place, and time. Mental status is at baseline.   Psychiatric:         Mood and Affect: Mood normal.         Behavior: Behavior normal.         Thought Content: Thought content normal.         Judgment: Judgment normal.         A chaperone was offered to the patient during today's exam. Patient declined chaperone.    Labs: Labs drawn by nephrologist, DARCIE.    Assessment & Plan:     76 y.o. male with the following -     Problem List Items Addressed This Visit     Atrial fibrillation (HCC)     On Coumadin.  Followed by cardiology.  Patient is rate controlled.  Pulse of 77 today in clinic.         DM (diabetes mellitus) (HCC)     The patient's A1c continues to improve with his diet.  Currently with an A1c of 5.7%.  On Tradjenta 5 mg daily.  Patient to continue incredible work on his dieting, and exercise.  We will have him follow-up in 4 months         Relevant Medications    linagliptin (TRADJENTA) 5 MG Tab tablet          I spent a total of 25 minutes with record review, exam, communication with the patient, communication with other providers, and  documentation of this encounter.      No follow-ups on file.    Please note that this dictation was created using voice recognition software. I have made every reasonable attempt to correct obvious errors, but I expect that there are errors of grammar and possibly content that I did not discover before finalizing the note.

## 2021-11-01 PROBLEM — E11.9 DM (DIABETES MELLITUS) (HCC): Status: ACTIVE | Noted: 2021-11-01

## 2021-11-01 NOTE — ASSESSMENT & PLAN NOTE
The patient's A1c continues to improve with his diet.  Currently with an A1c of 5.7%.  On Tradjenta 5 mg daily.  Patient to continue incredible work on his dieting, and exercise.  We will have him follow-up in 4 months

## 2022-02-02 ENCOUNTER — NON-PROVIDER VISIT (OUTPATIENT)
Dept: INTERNAL MEDICINE | Facility: OTHER | Age: 77
End: 2022-02-02
Payer: MEDICARE

## 2022-02-02 VITALS — WEIGHT: 164.6 LBS | BODY MASS INDEX: 25.03 KG/M2

## 2022-02-02 DIAGNOSIS — I10 HYPERTENSION, UNSPECIFIED TYPE: ICD-10-CM

## 2022-02-02 DIAGNOSIS — E11.9 TYPE 2 DIABETES MELLITUS WITHOUT COMPLICATION, WITHOUT LONG-TERM CURRENT USE OF INSULIN (HCC): ICD-10-CM

## 2022-02-02 PROCEDURE — 97803 MED NUTRITION INDIV SUBSEQ: CPT | Performed by: DIETITIAN, REGISTERED

## 2022-02-02 NOTE — PROGRESS NOTES
Salvatore is here for FU with RD for Type 2 Diabetes nutrition education. Previously saw him over 3 months ago for nutrition education    His A1c has continued to decrease with most recent lab at 5.7% with plans to get new labs done in April 2022  He continues with Tradjenta 5 mg daily   He denies any low blood sugars    He requested a sample glucometer today so he can check in on his blood sugars at home so did give him one today. Recommended only occasionally or if he's curious; not that he needs to do it every day     He reports his diet is going well and his weight has been stable  Energy is good  Walking 3 days per week for 2 miles per day  No sugar sweetened beverages  Watching his sodium and cholesterol/fat intake  Eating his veggies/fruits and liking them with his meals   He is limiting eating out -  Maybe once per month or less with burger and milk shake  Or tacos but doesn't over do it   Keeping his carbohydrates between 45-60 grams per meal and finds that works best for him and likes the energy he has   He listens to his body and eats more when he needs  He had some questions regarding ice cream at bedtime so encouraged healthy portions  Eating lean proteins, veggies and one carb per meal  Reading labels to look for sodium, cholesterol and carbs   Uses [employer] 2x/week   Drinking lots of water   No alcohol     He has not seen the eye doctor in over a year so recommended he go back to get scheduled to make sure there are no issues   He is staying on top of his appointments for his kidneys and cardiology     He is up to date on his vaccines    Salvatore has done a great job with managing his lifestyle behaviors and being consistent. He had some questions today regarding his diet and perhaps going back to work. Provided answers and support for those questions. Recommend return in 6 months or earlier, if needed.    Time spent: 30 minutes

## 2022-02-02 NOTE — PATIENT INSTRUCTIONS
Goals:  1. Check your blood sugars with the sample meter when you want   -first thing in the morning with no food: goal is   -if you check it 1 after eating: goal is   2. If you do end up moving more or working, stay closer to 60grams of carbs per meal and add in snacks as needed   3. Watch out for any low blood sugars  -dizzy, lightheaded, sweating signs to check your blood sugar  4. Keep up the great work!!!!   5. Schedule that eye doctor appointment

## 2022-04-13 ENCOUNTER — OFFICE VISIT (OUTPATIENT)
Dept: MEDICAL GROUP | Facility: CLINIC | Age: 77
End: 2022-04-13
Payer: MEDICARE

## 2022-04-13 VITALS
HEART RATE: 84 BPM | OXYGEN SATURATION: 97 % | DIASTOLIC BLOOD PRESSURE: 70 MMHG | SYSTOLIC BLOOD PRESSURE: 100 MMHG | HEIGHT: 69 IN | WEIGHT: 164 LBS | BODY MASS INDEX: 24.29 KG/M2 | TEMPERATURE: 98 F

## 2022-04-13 DIAGNOSIS — Z00.00 PREVENTATIVE HEALTH CARE: ICD-10-CM

## 2022-04-13 DIAGNOSIS — I10 HYPERTENSION, UNSPECIFIED TYPE: ICD-10-CM

## 2022-04-13 DIAGNOSIS — E11.9 TYPE 2 DIABETES MELLITUS WITHOUT COMPLICATION, WITHOUT LONG-TERM CURRENT USE OF INSULIN (HCC): ICD-10-CM

## 2022-04-13 DIAGNOSIS — I48.91 ATRIAL FIBRILLATION, UNSPECIFIED TYPE (HCC): ICD-10-CM

## 2022-04-13 LAB
HBA1C MFR BLD: 6.3 % (ref 0–5.6)
INT CON NEG: ABNORMAL
INT CON POS: ABNORMAL

## 2022-04-13 PROCEDURE — 83036 HEMOGLOBIN GLYCOSYLATED A1C: CPT

## 2022-04-13 PROCEDURE — 99213 OFFICE O/P EST LOW 20 MIN: CPT | Mod: GC

## 2022-04-13 RX ORDER — LINAGLIPTIN 5 MG/1
5 TABLET, FILM COATED ORAL DAILY
Qty: 90 TABLET | Refills: 4 | Status: SHIPPED | OUTPATIENT
Start: 2022-04-13 | End: 2023-06-07

## 2022-04-13 RX ORDER — DIGOXIN 250 MCG
TABLET ORAL
COMMUNITY
Start: 2022-03-05

## 2022-04-13 RX ORDER — ATORVASTATIN CALCIUM 40 MG/1
40 TABLET, FILM COATED ORAL NIGHTLY
Qty: 90 TABLET | Refills: 3 | Status: SHIPPED | OUTPATIENT
Start: 2022-04-13 | End: 2023-04-06

## 2022-04-13 ASSESSMENT — PATIENT HEALTH QUESTIONNAIRE - PHQ9: CLINICAL INTERPRETATION OF PHQ2 SCORE: 0

## 2022-04-13 NOTE — ASSESSMENT & PLAN NOTE
Patient has had increasing A1c to 6.3% this time.  He thinks it may be recently he is not eating as well as he was before.  He also reports that he is not exercising and walking as much as he used to.  He is not interested in adding additional medications like Metformin at this time but if continues to have problems is open to maybe starting Metformin at the next visit.    Plan:    -Continue Tradjenta 5 mg  -I will start Metformin at next visit if A1c continues to be elevated.  -Encouraged patient to check his feet every night, diabetic foot exam here negative.

## 2022-04-13 NOTE — PROGRESS NOTES
"Subjective:     CC: Med refill    HPI:   Salvatore presents today with    Problem   Preventative Health Care    Colonoscopy at 67     DM (Diabetes Mellitus) (Hcc)    A1c from 8/21 of 5.8%.  A1C from 4/13/22 of 6.3%  On Tradjenta 5 mg daily     Atrial Fibrillation (Hcc)   Hypertension    On metoprolol 25 mg and lisinopril 5 mg         Health Maintenance: Completed    ROS:  Review of Systems   All other systems reviewed and are negative.      Objective:     Exam:  /70 (BP Location: Right arm)   Pulse 84   Temp 36.7 °C (98 °F)   Ht 1.753 m (5' 9\")   Wt 74.4 kg (164 lb)   SpO2 97%   BMI 24.22 kg/m²  Body mass index is 24.22 kg/m².    Physical Exam  Vitals reviewed.   Constitutional:       Appearance: Normal appearance. He is normal weight.   HENT:      Head: Normocephalic and atraumatic.      Right Ear: External ear normal.      Left Ear: External ear normal.      Nose: Nose normal.      Mouth/Throat:      Mouth: Mucous membranes are moist.      Pharynx: Oropharynx is clear.   Eyes:      Extraocular Movements: Extraocular movements intact.      Conjunctiva/sclera: Conjunctivae normal.      Pupils: Pupils are equal, round, and reactive to light.   Cardiovascular:      Rate and Rhythm: Normal rate and regular rhythm.      Pulses: Normal pulses.      Heart sounds: Normal heart sounds.   Pulmonary:      Effort: Pulmonary effort is normal.      Breath sounds: Normal breath sounds.   Abdominal:      General: Abdomen is flat.      Palpations: Abdomen is soft.   Musculoskeletal:         General: Normal range of motion.      Cervical back: Normal range of motion.   Skin:     General: Skin is warm and dry.   Neurological:      General: No focal deficit present.      Mental Status: He is alert and oriented to person, place, and time. Mental status is at baseline.      Sensory: No sensory deficit.   Psychiatric:         Mood and Affect: Mood normal.         Behavior: Behavior normal.         Thought Content: Thought " content normal.         Judgment: Judgment normal.         A chaperone was offered to the patient during today's exam. Patient declined chaperone.    Labs:     Assessment & Plan:     76 y.o. male with the following -     Problem List Items Addressed This Visit     Atrial fibrillation (HCC)     Patient is rate controlled and followed by cardiology.  On Coumadin.  Pulse of 84 here today in clinic.    Plan:    -Cardiology managing         Relevant Medications    digoxin (LANOXIN) 250 MCG Tab    atorvastatin (LIPITOR) 40 MG Tab    Hypertension     On lisinopril 5 mg and metoprolol 25 mg.  Hypertension is largely been controlled with exercise and diet recently.  100/70 in clinic today.  Patient denies any episodes of feeling dizzy or lightheaded when standing up or upon waking.         Relevant Medications    digoxin (LANOXIN) 250 MCG Tab    atorvastatin (LIPITOR) 40 MG Tab    DM (diabetes mellitus) (HCC)     Patient has had increasing A1c to 6.3% this time.  He thinks it may be recently he is not eating as well as he was before.  He also reports that he is not exercising and walking as much as he used to.  He is not interested in adding additional medications like Metformin at this time but if continues to have problems is open to maybe starting Metformin at the next visit.    Plan:    -Continue Tradjenta 5 mg  -I will start Metformin at next visit if A1c continues to be elevated.  -Encouraged patient to check his feet every night, diabetic foot exam here negative.         Relevant Medications    linagliptin (TRADJENTA) 5 MG Tab tablet    Other Relevant Orders    POCT  A1C    Preventative health care     Patient will need referral to colonoscopy at next appointment in 6 months time from now.               Referral for genetic research was offered. Patient declined.    I spent a total of 30 minutes with record review, exam, communication with the patient, communication with other providers, and documentation of this  encounter.      No follow-ups on file.    Please note that this dictation was created using voice recognition software. I have made every reasonable attempt to correct obvious errors, but I expect that there are errors of grammar and possibly content that I did not discover before finalizing the note.

## 2022-04-13 NOTE — ASSESSMENT & PLAN NOTE
On lisinopril 5 mg and metoprolol 25 mg.  Hypertension is largely been controlled with exercise and diet recently.  100/70 in clinic today.  Patient denies any episodes of feeling dizzy or lightheaded when standing up or upon waking.

## 2022-08-03 ENCOUNTER — NON-PROVIDER VISIT (OUTPATIENT)
Dept: INTERNAL MEDICINE | Facility: OTHER | Age: 77
End: 2022-08-03
Payer: MEDICARE

## 2022-08-03 DIAGNOSIS — E11.9 TYPE 2 DIABETES MELLITUS WITHOUT COMPLICATION, WITHOUT LONG-TERM CURRENT USE OF INSULIN (HCC): ICD-10-CM

## 2022-08-03 DIAGNOSIS — I10 HYPERTENSION, UNSPECIFIED TYPE: ICD-10-CM

## 2022-08-03 PROCEDURE — 97803 MED NUTRITION INDIV SUBSEQ: CPT | Performed by: DIETITIAN, REGISTERED

## 2022-08-03 NOTE — PATIENT INSTRUCTIONS
Goals:  Take a few days to double check your carbohydrate intake. Look at the Total Carbohydrates on your label after looking at the serving size. Aim for 45-60 grams per meal. Include lean protein and veggies  If you do check your blood sugars, fasting in the morning goal is less than 130. 1 hour after eating goal is less than 180   Keep up with your walking. Add in more days when it gets cooler out. Keep up with those sit ups and push ups.

## 2022-08-03 NOTE — PROGRESS NOTES
Salvatore is here for FU with RD for type 2 diabetes  Prev saw him 6 months ago     Recent A1c went up to 6.3% which is up from 5.8% but not where he has been before at 7.1%   He felt he cut back on his walking and feels that lead to a higher BS  Walking 2x/week for 1-2 miles  Goes shopping a lot 2-3x/week and tries to walk more there  Doing push ups for sit ups     Has been sticking to calorie free beverages and drinking more water  Hasn't checked his blood sugars  Eating out 2x/month   Denies hypoglycemia     Reviewed his current food choices and helped look at a few food labels to support him with making good choices.   We reviewed carbohydrate counting as well and continue to recommend to him to limit carbs to 45-60 grams per meal to support his blood sugars. Also encouraged more movement as the weather improves. Set goals; rtc with RD in 6 months    Time spent: 30 minutes

## 2022-08-14 ENCOUNTER — OFFICE VISIT (OUTPATIENT)
Dept: URGENT CARE | Facility: CLINIC | Age: 77
End: 2022-08-14
Payer: MEDICARE

## 2022-08-14 VITALS
HEART RATE: 85 BPM | OXYGEN SATURATION: 98 % | BODY MASS INDEX: 24.14 KG/M2 | DIASTOLIC BLOOD PRESSURE: 62 MMHG | TEMPERATURE: 97.1 F | RESPIRATION RATE: 14 BRPM | SYSTOLIC BLOOD PRESSURE: 108 MMHG | WEIGHT: 163 LBS | HEIGHT: 69 IN

## 2022-08-14 DIAGNOSIS — J02.0 STREP THROAT: ICD-10-CM

## 2022-08-14 DIAGNOSIS — U07.1 COVID-19: ICD-10-CM

## 2022-08-14 LAB
EXTERNAL QUALITY CONTROL: ABNORMAL
INT CON NEG: NEGATIVE
INT CON POS: POSITIVE
S PYO AG THROAT QL: POSITIVE
SARS-COV+SARS-COV-2 AG RESP QL IA.RAPID: POSITIVE

## 2022-08-14 PROCEDURE — 99213 OFFICE O/P EST LOW 20 MIN: CPT | Mod: CS | Performed by: PHYSICIAN ASSISTANT

## 2022-08-14 PROCEDURE — 87426 SARSCOV CORONAVIRUS AG IA: CPT | Performed by: PHYSICIAN ASSISTANT

## 2022-08-14 PROCEDURE — 87880 STREP A ASSAY W/OPTIC: CPT | Performed by: PHYSICIAN ASSISTANT

## 2022-08-14 RX ORDER — AMOXICILLIN 500 MG/1
500 CAPSULE ORAL 2 TIMES DAILY
Qty: 20 CAPSULE | Refills: 0 | Status: SHIPPED | OUTPATIENT
Start: 2022-08-14 | End: 2022-08-24

## 2022-08-14 ASSESSMENT — ENCOUNTER SYMPTOMS
HEADACHES: 1
WHEEZING: 0
SPUTUM PRODUCTION: 1
NAUSEA: 0
DIARRHEA: 0
MYALGIAS: 1
CHILLS: 1
COUGH: 1
ABDOMINAL PAIN: 0
SWEATS: 0
RHINORRHEA: 0
FEVER: 1
SHORTNESS OF BREATH: 0
VOMITING: 0
SORE THROAT: 1

## 2022-08-14 NOTE — PROGRESS NOTES
"Subjective     Salvatore Bowen is a 77 y.o. male who presents with Coronavirus Screening (Tested Positive on home test yesterday.  He is having sore throat, congestion, fever (100.6), bodyaches, cough.  )            Cough  This is a new problem. Episode onset: 2 days. The problem has been unchanged. The problem occurs constantly. The cough is Productive of sputum (clear). Associated symptoms include chills, a fever (100.7 TMAX), headaches, myalgias and a sore throat. Pertinent negatives include no ear pain, nasal congestion, rash, rhinorrhea, shortness of breath, sweats or wheezing. Associated symptoms comments: bodyaches. He has tried nothing for the symptoms.    The patient tested positive for COVID yesterday.   Wife has had symptoms for 4 days.   He is fully vaccinated and has had 2 boosters.,    Past Medical History:   Diagnosis Date    Gastric ulcer 2012    Hypertension 08/03/2020    Seizure disorder (HCC) 1983    r/t stroke in 1983    Stroke ([facility]) 1983    no residual weakness       Past Surgical History:   Procedure Laterality Date    INGUINAL HERNIA LAPAROSCOPIC Right 8/6/2020    Procedure: REPAIR, HERNIA, INGUINAL, LAPAROSCOPIC;  Surgeon: Brad Camarillo M.D.;  Location: [facility];  Service: Vascular    OTHER ABDOMINAL SURGERY  2019    hernia    OTHER NEUROLOGICAL SURG  1983    \"Put a plate in my skull r/t stroke\"         No family history on file.    No Known Allergies    Medications, Allergies, and current problem list reviewed today in Epic      Review of Systems   Constitutional:  Positive for chills, fever (100.7 TMAX) and malaise/fatigue.   HENT:  Positive for sore throat. Negative for congestion, ear pain and rhinorrhea.    Respiratory:  Positive for cough and sputum production. Negative for shortness of breath and wheezing.    Gastrointestinal:  Negative for abdominal pain, diarrhea, nausea and vomiting.   Musculoskeletal:  Positive for myalgias.   Skin:  Negative for rash. " "  Neurological:  Positive for headaches.      All other systems reviewed and are negative.         Objective     /62   Pulse 85   Temp 36.2 °C (97.1 °F) (Temporal)   Resp 14   Ht 1.753 m (5' 9\")   Wt 73.9 kg (163 lb)   SpO2 98%   BMI 24.07 kg/m²      Physical Exam  Constitutional:       General: He is not in acute distress.     Appearance: Normal appearance. He is not ill-appearing.   HENT:      Head: Normocephalic and atraumatic.      Mouth/Throat:      Mouth: Mucous membranes are moist.      Pharynx: Posterior oropharyngeal erythema present. No oropharyngeal exudate.   Eyes:      Conjunctiva/sclera: Conjunctivae normal.   Cardiovascular:      Rate and Rhythm: Normal rate and regular rhythm.      Heart sounds: Normal heart sounds.   Pulmonary:      Effort: Pulmonary effort is normal. No respiratory distress.      Breath sounds: Normal breath sounds. No wheezing, rhonchi or rales.   Skin:     General: Skin is warm and dry.      Findings: No rash.   Neurological:      General: No focal deficit present.      Mental Status: He is alert and oriented to person, place, and time.   Psychiatric:         Mood and Affect: Mood normal.         Behavior: Behavior normal.         Thought Content: Thought content normal.         Judgment: Judgment normal.                           Assessment & Plan        1. COVID-19  POCT SARS-COV Antigen AVEL (Symptomatic only)    POCT Rapid Strep A    molnupiravir 200 MG capsule      2. Strep throat  amoxicillin (AMOXIL) 500 MG Cap        Poct covid- positive   Poct STrep- positive       Current Outpatient Medications:     molnupiravir 200 MG capsule, Take 4 Capsules by mouth 2 times a day for 5 days., Disp: 40 Capsule, Rfl: 0    amoxicillin (AMOXIL) 500 MG Cap, Take 1 Capsule by mouth 2 times a day for 10 days., Disp: 20 Capsule, Rfl: 0    Differential diagnoses, Supportive care, and indications for immediate follow-up discussed with patient.   Pathogenesis of diagnosis discussed " including typical length and natural progression.   Instructed to return to clinic or nearest emergency department for any change in condition, further concerns, or worsening of symptoms.    Discussed that this antiviral medication is Emergency use Authorized.   Fact sheet printed and given to patient.    .The patient demonstrated a good understanding and agreed with the treatment plan.      Sahra Mckinnon P.A.-C.

## 2022-10-03 ENCOUNTER — OFFICE VISIT (OUTPATIENT)
Dept: MEDICAL GROUP | Facility: CLINIC | Age: 77
End: 2022-10-03
Payer: MEDICARE

## 2022-10-03 VITALS
WEIGHT: 162 LBS | OXYGEN SATURATION: 94 % | HEIGHT: 68 IN | TEMPERATURE: 98 F | HEART RATE: 60 BPM | BODY MASS INDEX: 24.55 KG/M2 | SYSTOLIC BLOOD PRESSURE: 118 MMHG | DIASTOLIC BLOOD PRESSURE: 66 MMHG

## 2022-10-03 DIAGNOSIS — Z00.00 PREVENTATIVE HEALTH CARE: ICD-10-CM

## 2022-10-03 DIAGNOSIS — I10 HYPERTENSION, UNSPECIFIED TYPE: ICD-10-CM

## 2022-10-03 DIAGNOSIS — I48.91 ATRIAL FIBRILLATION, UNSPECIFIED TYPE (HCC): ICD-10-CM

## 2022-10-03 DIAGNOSIS — Z12.11 ENCOUNTER FOR SCREENING COLONOSCOPY: ICD-10-CM

## 2022-10-03 DIAGNOSIS — E11.9 TYPE 2 DIABETES MELLITUS WITHOUT COMPLICATION, WITHOUT LONG-TERM CURRENT USE OF INSULIN (HCC): ICD-10-CM

## 2022-10-03 DIAGNOSIS — Z23 ENCOUNTER FOR VACCINATION: ICD-10-CM

## 2022-10-03 LAB
HBA1C MFR BLD: 6.3 % (ref 0–5.6)
INT CON NEG: ABNORMAL
INT CON POS: ABNORMAL

## 2022-10-03 PROCEDURE — G0439 PPPS, SUBSEQ VISIT: HCPCS | Mod: 25,GE

## 2022-10-03 PROCEDURE — 83036 HEMOGLOBIN GLYCOSYLATED A1C: CPT | Mod: GC

## 2022-10-03 PROCEDURE — G0008 ADMIN INFLUENZA VIRUS VAC: HCPCS

## 2022-10-03 PROCEDURE — 90662 IIV NO PRSV INCREASED AG IM: CPT

## 2022-10-03 NOTE — ASSESSMENT & PLAN NOTE
Pulse of 78 on my exam today. Pt asymptomatic.    Will defer work-up to cardiology. Echo from cardiology to re-evaluate CHF pending.

## 2022-10-03 NOTE — ASSESSMENT & PLAN NOTE
- Continue Tradjenta  -Encouraged increased exercise.  Patient in agreement.  -Encouraged continued dietary changes.  Patient has been doing well with this.  -Patient currently not interested in metformin, however, if A1c rises will be interested in adding additional medication. Given hx of CHF, will consider SGLT2

## 2022-10-03 NOTE — PROGRESS NOTES
Subjective:     CC: Diabetes follow up and preventative care    HPI:   Salvatore presents today with:    Problem   Preventative Health Care    Colonoscopy referral placed on 10/2/22    Flu vaccine 10/3/22 given  Pt will go to pharmacy for Shingrix.  Pt not interested in Prevnar/Hep B/TDAP at this time. Counseled on why I recommend them.     DM (Diabetes Mellitus) (Hcc)    - On Tradjenta 5 mg daily  - Does push-ups and sit ups every other day  - Walking 2-3 times per week. Goal to increase walking.    A1C:  8/21 of 5.8%.  4/22 of 6.3%  10/22 of 6.3%    CMP/Microalbumin/Lipid panel due in 2023.       Atrial Fibrillation (Hcc)    Managed by cardiology. Rate controlled    Medications:    Metoprolol 25 mg BID  Digoxin 250 MCG  Warfarin 5 mg Qday        Hypertension    On metoprolol 25 mg and lisinopril 5 mg         Current Outpatient Medications Ordered in Epic   Medication Sig Dispense Refill    digoxin (LANOXIN) 250 MCG Tab 1 po daily      linagliptin (TRADJENTA) 5 MG Tab tablet Take 1 Tablet by mouth every day. 90 Tablet 4    warfarin (COUMADIN) 2.5 MG Tab Take 2.5 mg by mouth every day.      lisinopril (PRINIVIL) 5 MG Tab Take 5 mg by mouth every bedtime.      tamsulosin (FLOMAX) 0.4 MG capsule Take 0.4 mg by mouth every bedtime.      atorvastatin (LIPITOR) 40 MG Tab Take 40 mg by mouth every evening.      metoprolol (LOPRESSOR) 25 MG TABS Take 1 Tab by mouth 2 Times a Day. 60 Tab 0    atorvastatin (LIPITOR) 40 MG Tab Take 1 Tablet by mouth every evening. (Patient not taking: Reported on 8/14/2022) 90 Tablet 3    linagliptin (TRADJENTA) 5 MG Tab tablet Take 1 Tablet by mouth every day. (Patient not taking: No sig reported) 90 Tablet 4     No current Epic-ordered facility-administered medications on file.       Health Maintenance: Completed    ROS:  Gen: no fevers/chills, no changes in weight  Eyes: no changes in vision  ENT: no sore throat, no hearing loss, no bloody nose  Pulm: no sob, no cough  CV: no chest pain, no  "palpitations  GI: no nausea/vomiting, no diarrhea  : no dysuria  MSk: no myalgias  Skin: no rash  Neuro: no headaches, no numbness/tingling  Heme/Lymph: no easy bruising      Objective:     Exam:  /66 (BP Location: Right arm, Patient Position: Sitting, BP Cuff Size: Adult)   Pulse 60   Temp 36.7 °C (98 °F)   Ht 1.727 m (5' 8\")   Wt 73.5 kg (162 lb)   SpO2 94%   BMI 24.63 kg/m²  Body mass index is 24.63 kg/m².    Gen: Alert and oriented, No apparent distress.  HEENT: PERRL, EOMI, NCAT, uvula midline, no exudates  Neck: Neck is supple without lymphadenopathy.  Lungs: Normal effort, CTA bilaterally, no wheezes, rhonchi, or rales  CV: Irregularly irregular. No murmurs, rubs, or gallops.  Abd:  Soft, Non-distended, non-tender  Ext: No clubbing, cyanosis, edema.  Skin: No rashes, no erythema    A chaperone was offered to the patient during today's exam. Patient declined chaperone.    Labs: Reviewed. POCT A1C ordered    Assessment & Plan:     77 y.o. male with the following:     Problem List Items Addressed This Visit       Atrial fibrillation (HCC)     Pulse of 78 on my exam today. Pt asymptomatic.    Will defer work-up to cardiology. Echo from cardiology to re-evaluate CHF pending.         Hypertension     BP well controlled. No s/s of low BP or orthostatic hypotension from history.    Continue current regimen         DM (diabetes mellitus) (HCC)     - Continue Tradjenta  -Encouraged increased exercise.  Patient in agreement.  -Encouraged continued dietary changes.  Patient has been doing well with this.  -Patient currently not interested in metformin, however, if A1c rises will be interested in adding additional medication. Given hx of CHF, will consider SGLT2         Relevant Orders    POCT  A1C (Completed)    Preventative health care     - Colonoscopy referral placed.  - Vaccines reviewed.  Of the vaccines that we have here the patient only wants the flu shot.  Also wants Shingrix but will need to go to " pharmacy for this.            Other Visit Diagnoses       Encounter for screening colonoscopy        Relevant Orders    Referral to GI for Colonoscopy    Encounter for vaccination        Relevant Orders    INFLUENZA VACCINE, HIGH DOSE (65+ ONLY) (Completed)                No follow-ups on file.

## 2022-10-03 NOTE — ASSESSMENT & PLAN NOTE
BP well controlled. No s/s of low BP or orthostatic hypotension from history.    Continue current regimen

## 2022-10-03 NOTE — ASSESSMENT & PLAN NOTE
- Colonoscopy referral placed.  - Vaccines reviewed.  Of the vaccines that we have here the patient only wants the flu shot.  Also wants Shingrix but will need to go to pharmacy for this.

## 2023-03-27 ENCOUNTER — OFFICE VISIT (OUTPATIENT)
Dept: MEDICAL GROUP | Facility: CLINIC | Age: 78
End: 2023-03-27
Payer: MEDICARE

## 2023-03-27 VITALS
BODY MASS INDEX: 24.14 KG/M2 | DIASTOLIC BLOOD PRESSURE: 70 MMHG | SYSTOLIC BLOOD PRESSURE: 122 MMHG | HEIGHT: 69 IN | TEMPERATURE: 97.3 F | WEIGHT: 163 LBS | OXYGEN SATURATION: 98 % | HEART RATE: 86 BPM

## 2023-03-27 DIAGNOSIS — E11.9 TYPE 2 DIABETES MELLITUS WITHOUT COMPLICATION, WITHOUT LONG-TERM CURRENT USE OF INSULIN (HCC): ICD-10-CM

## 2023-03-27 LAB
HBA1C MFR BLD: 6.3 % (ref ?–5.8)
POCT INT CON NEG: NEGATIVE
POCT INT CON POS: POSITIVE

## 2023-03-27 PROCEDURE — 83036 HEMOGLOBIN GLYCOSYLATED A1C: CPT

## 2023-03-27 PROCEDURE — 99213 OFFICE O/P EST LOW 20 MIN: CPT | Mod: GE

## 2023-03-27 ASSESSMENT — FIBROSIS 4 INDEX: FIB4 SCORE: 1.47

## 2023-03-27 ASSESSMENT — PATIENT HEALTH QUESTIONNAIRE - PHQ9: CLINICAL INTERPRETATION OF PHQ2 SCORE: 0

## 2023-03-27 NOTE — PROGRESS NOTES
"Subjective:     CC: Diabetes    HPI:   Salvatore presents today with     Problem   DM (Diabetes Mellitus) (Hcc)    - On Tradjenta 5 mg daily  - Does push-ups and sit ups every other day  - Walking 2-3 times per week. Goal to increase walking.    A1C:  8/21 of 5.8%.  4/22 of 6.3%  10/22 of 6.3%  3/23 of 6.3%    CMP/Microalbumin/Lipid panel performed by Kidney doctor and Cardiologist at [place].           Current Outpatient Medications Ordered in Epic   Medication Sig Dispense Refill    digoxin (LANOXIN) 250 MCG Tab 1 po daily      atorvastatin (LIPITOR) 40 MG Tab Take 1 Tablet by mouth every evening. (Patient not taking: Reported on 8/14/2022) 90 Tablet 3    linagliptin (TRADJENTA) 5 MG Tab tablet Take 1 Tablet by mouth every day. (Patient not taking: No sig reported) 90 Tablet 4    linagliptin (TRADJENTA) 5 MG Tab tablet Take 1 Tablet by mouth every day. 90 Tablet 4    warfarin (COUMADIN) 2.5 MG Tab Take 2.5 mg by mouth every day.      lisinopril (PRINIVIL) 5 MG Tab Take 5 mg by mouth every bedtime.      tamsulosin (FLOMAX) 0.4 MG capsule Take 0.4 mg by mouth every bedtime.      atorvastatin (LIPITOR) 40 MG Tab Take 40 mg by mouth every evening.      metoprolol (LOPRESSOR) 25 MG TABS Take 1 Tab by mouth 2 Times a Day. 60 Tab 0     No current Epic-ordered facility-administered medications on file.       Health Maintenance: Completed    ROS:  Gen: no fevers/chills, no changes in weight  Eyes: no changes in vision  ENT: no sore throat, no hearing loss, no bloody nose  Pulm: no sob, no cough  CV: no chest pain, no palpitations  GI: no nausea/vomiting, no diarrhea  : no dysuria  MSk: no myalgias  Skin: no rash  Neuro: no headaches, no numbness/tingling  Heme/Lymph: no easy bruising      Objective:     Exam:  /70   Pulse 86   Temp 36.3 °C (97.3 °F)   Ht 1.753 m (5' 9\")   Wt 73.9 kg (163 lb)   SpO2 98%   BMI 24.07 kg/m²  Body mass index is 24.07 kg/m².    Gen: Alert and oriented, No apparent " "distress.  HEENT: PERRL, EOMI, NCAT, uvula midline, no exudates  Neck: Neck is supple without lymphadenopathy.  Lungs: Normal effort, CTA bilaterally, no wheezes, rhonchi, or rales  CV: Regular rate and rhythm. No murmurs, rubs, or gallops.  Abd:  Soft, Non-distended, non-tender  Ext: No clubbing, cyanosis, edema.  Skin: + rash on right forearm. Been there for \"ten years\" Pt thinks it slowly gets smaller. no erythema    Labs: Reviewed.    Assessment & Plan:     77 y.o. male with the following:     Problem List Items Addressed This Visit       DM (diabetes mellitus) (HCC)     - Will continue current regimen. Discussed SGLT2 with patient given his heart disease and CKD.  - Pt currently not interested in SGLT2         Relevant Orders    POCT  A1C (Completed)       #Rash - will follow at next visit. Appears to be wound that is healing. DDX also includes SCC (less likely given hx of it improving), or chronically infected wound. May require biopsy if still present.        No follow-ups on file.           "

## 2023-03-28 NOTE — ASSESSMENT & PLAN NOTE
- Will continue current regimen. Discussed SGLT2 with patient given his heart disease and CKD.  - Pt currently not interested in SGLT2

## 2023-04-06 RX ORDER — ATORVASTATIN CALCIUM 40 MG/1
40 TABLET, FILM COATED ORAL NIGHTLY
Qty: 90 TABLET | Refills: 3 | Status: SHIPPED | OUTPATIENT
Start: 2023-04-06

## 2023-06-07 RX ORDER — LINAGLIPTIN 5 MG/1
5 TABLET, FILM COATED ORAL DAILY
Qty: 90 TABLET | Refills: 4 | Status: SHIPPED | OUTPATIENT
Start: 2023-06-07 | End: 2024-01-30

## 2023-09-06 ENCOUNTER — OFFICE VISIT (OUTPATIENT)
Dept: MEDICAL GROUP | Facility: CLINIC | Age: 78
End: 2023-09-06
Payer: MEDICARE

## 2023-09-06 VITALS
HEIGHT: 69 IN | WEIGHT: 161 LBS | SYSTOLIC BLOOD PRESSURE: 130 MMHG | OXYGEN SATURATION: 98 % | BODY MASS INDEX: 23.85 KG/M2 | TEMPERATURE: 97.8 F | DIASTOLIC BLOOD PRESSURE: 70 MMHG | HEART RATE: 64 BPM

## 2023-09-06 DIAGNOSIS — E11.9 TYPE 2 DIABETES MELLITUS WITHOUT COMPLICATION, WITHOUT LONG-TERM CURRENT USE OF INSULIN (HCC): ICD-10-CM

## 2023-09-06 DIAGNOSIS — I10 HYPERTENSION, UNSPECIFIED TYPE: ICD-10-CM

## 2023-09-06 LAB
HBA1C MFR BLD: 6.6 % (ref ?–5.8)
POCT INT CON NEG: NEGATIVE
POCT INT CON POS: POSITIVE

## 2023-09-06 PROCEDURE — 3078F DIAST BP <80 MM HG: CPT

## 2023-09-06 PROCEDURE — 99213 OFFICE O/P EST LOW 20 MIN: CPT | Mod: GE

## 2023-09-06 PROCEDURE — 83036 HEMOGLOBIN GLYCOSYLATED A1C: CPT

## 2023-09-06 PROCEDURE — 3075F SYST BP GE 130 - 139MM HG: CPT

## 2023-09-06 ASSESSMENT — FIBROSIS 4 INDEX: FIB4 SCORE: 2.02

## 2023-09-06 NOTE — PROGRESS NOTES
Subjective:     CC:   Chief Complaint   Patient presents with    Follow-Up     6 mts follow up       HPI:   Salvatore presents today for 6-month follow-up, he has no acute health concerns for today's visit.  Patient was seen by his nephrologist yesterday and they discussed a recent decrease in his GFR.  They anticipate to repeat labs within the next 2 months and suspect it is likely secondary to antibiotic use in the setting of UTI.  Patient unaware as to why he is set up with a nephrologist.  There is no documentation in his medical record to suggest chronic kidney disease.  The patient does have a chronic but healing wound in his right upper arm in the setting of well-controlled DM.  He has no concerns at this time and states it is largely asymptomatic.  Patient is aware of return precautions concerning for infection.    No refills needed at this time.     Problem   DM (Diabetes Mellitus) (Hcc)    - On Tradjenta 5 mg daily  - Does push-ups and sit ups every other day  - Walking 2-3 times per week. Goal to increase walking.    A1C:  8/21 of 5.8%.  4/22 of 6.3%  10/22 of 6.3%  3/23 of 6.3%    CMP/Microalbumin/Lipid panel performed by Kidney doctor and Cardiologist at [facility].       Hypertension    On metoprolol 25 mg and lisinopril 5 mg         Current Outpatient Medications Ordered in Epic   Medication Sig Dispense Refill    atorvastatin (LIPITOR) 40 MG Tab TAKE 1 TABLET BY MOUTH EVERY EVENING 90 Tablet 3    digoxin (LANOXIN) 250 MCG Tab 1 po daily      linagliptin (TRADJENTA) 5 MG Tab tablet Take 1 Tablet by mouth every day. 90 Tablet 4    warfarin (COUMADIN) 2.5 MG Tab Take 2.5 mg by mouth every day.      lisinopril (PRINIVIL) 5 MG Tab Take 5 mg by mouth every bedtime.      tamsulosin (FLOMAX) 0.4 MG capsule Take 0.4 mg by mouth every bedtime.      metoprolol (LOPRESSOR) 25 MG TABS Take 1 Tab by mouth 2 Times a Day. 60 Tab 0    TRADJENTA 5 MG Tab tablet TAKE 1 TABLET BY MOUTH EVERY DAY (Patient not taking:  "Reported on 9/6/2023) 90 Tablet 4     No current Epic-ordered facility-administered medications on file.       ROS:  Negative except for above in HPI    Objective:     Exam:  /70   Pulse 64   Temp 36.6 °C (97.8 °F)   Ht 1.753 m (5' 9\")   Wt 73 kg (161 lb)   SpO2 98%   BMI 23.78 kg/m²  Body mass index is 23.78 kg/m².    Gen: Alert and oriented, No apparent distress.  HEENT: NCAT, MMM, No lymphadenopathy  Lungs: Normal effort, CTA bilaterally, no wheezes, rhonchi, or rales  CV: Regular rate and rhythm. No murmurs, rubs, or gallops. Radial pulses palpable bilat  Abd: Soft, non-distended, no guarding, no rebound, non-tender to palpation  Ext: No clubbing, cyanosis, edema.  Skin:   Well-healing lesion with scab over right biceps  Neuro: Non-focal    Labs: No new labs    Assessment & Plan:     78 y.o. male with the following -     Problem List Items Addressed This Visit       Hypertension     Chronic, well controlled, currently on metoprolol 25 mg and lisinopril 5 mg daily.    Plan:  -Continue current medication regimen         DM (diabetes mellitus) (HCC)     Chronic, well controlled, POC A1c 6.6, currently on Tradjenta 5 mg daily, no refills needed at this time.  CMP/microalbumin and lipid panel performed by nephrologist at [facility].  Labs were reviewed and notable for decrease in GFR.  This is anticipated to be transient in the setting of antibiotic use for acute UTI.  Patient will be having repeat labs in approximately 2 months and will continue to follow-up with nephrology.    Plan:  -Repeat renal function labs in 2 months, follow-up with nephrology  -Continue Tradjenta 5 mg daily, no refills needed at this time  -Follow-up in 6 months  -Patient has not completed diabetic retinopathy eye exam and is not interested at this time and a referral         Relevant Orders    POCT A1C (Completed)       Return in about 6 months (around 3/6/2024).    Bashir Contreras M.D.        "

## 2023-09-06 NOTE — ASSESSMENT & PLAN NOTE
Chronic, well controlled, POC A1c 6.6, currently on Tradjenta 5 mg daily, no refills needed at this time.  CMP/microalbumin and lipid panel performed by nephrologist at [facility].  Labs were reviewed and notable for decrease in GFR.  This is anticipated to be transient in the setting of antibiotic use for acute UTI.  Patient will be having repeat labs in approximately 2 months and will continue to follow-up with nephrology.    Plan:  -Repeat renal function labs in 2 months, follow-up with nephrology  -Continue Tradjenta 5 mg daily, no refills needed at this time  -Follow-up in 6 months  -Patient has not completed diabetic retinopathy eye exam and is not interested at this time and a referral

## 2023-09-06 NOTE — ASSESSMENT & PLAN NOTE
Chronic, well controlled, currently on metoprolol 25 mg and lisinopril 5 mg daily.    Plan:  -Continue current medication regimen

## 2023-10-12 ENCOUNTER — OFFICE VISIT (OUTPATIENT)
Dept: MEDICAL GROUP | Facility: CLINIC | Age: 78
End: 2023-10-12
Payer: MEDICARE

## 2023-10-12 VITALS
BODY MASS INDEX: 24.55 KG/M2 | HEIGHT: 68 IN | OXYGEN SATURATION: 98 % | SYSTOLIC BLOOD PRESSURE: 117 MMHG | TEMPERATURE: 97.6 F | HEART RATE: 92 BPM | DIASTOLIC BLOOD PRESSURE: 66 MMHG | WEIGHT: 162 LBS

## 2023-10-12 DIAGNOSIS — L98.9 SKIN LESION OF LEFT ARM: ICD-10-CM

## 2023-10-12 PROCEDURE — 3078F DIAST BP <80 MM HG: CPT

## 2023-10-12 PROCEDURE — 99213 OFFICE O/P EST LOW 20 MIN: CPT | Mod: GE

## 2023-10-12 PROCEDURE — 3074F SYST BP LT 130 MM HG: CPT

## 2023-10-12 ASSESSMENT — FIBROSIS 4 INDEX: FIB4 SCORE: 2.02

## 2023-10-12 NOTE — ASSESSMENT & PLAN NOTE
- Pt to cover in vaseline, wash BID.   - Try to wear shirts in which the cuff does not continue to irritate that area.  - Improving, shrinking in size, healthy pink tissue noted at margins, no ulcerations, very low concern for malignancy  - No biopsy performed today  - Pt not interested in derm referral today. Will consider biopsy and referral in future if desired.

## 2023-10-12 NOTE — PROGRESS NOTES
"Subjective:     CC: Wound on arm    HPI:   Salvatore presents today with     Problem   Skin Lesion of Left Arm    Chronic, 8-9 months, slowly improving  No evidence of infection, diabetes well controlled. No ongoing trauma to area     Tia (Transient Ischemic Attack) (Resolved)   Chest Pain (Resolved)       Current Outpatient Medications Ordered in Epic   Medication Sig Dispense Refill    TRADJENTA 5 MG Tab tablet TAKE 1 TABLET BY MOUTH EVERY DAY (Patient not taking: Reported on 9/6/2023) 90 Tablet 4    atorvastatin (LIPITOR) 40 MG Tab TAKE 1 TABLET BY MOUTH EVERY EVENING 90 Tablet 3    digoxin (LANOXIN) 250 MCG Tab 1 po daily      linagliptin (TRADJENTA) 5 MG Tab tablet Take 1 Tablet by mouth every day. 90 Tablet 4    warfarin (COUMADIN) 2.5 MG Tab Take 2.5 mg by mouth every day.      lisinopril (PRINIVIL) 5 MG Tab Take 5 mg by mouth every bedtime.      tamsulosin (FLOMAX) 0.4 MG capsule Take 0.4 mg by mouth every bedtime.      metoprolol (LOPRESSOR) 25 MG TABS Take 1 Tab by mouth 2 Times a Day. 60 Tab 0     No current Epic-ordered facility-administered medications on file.       ROS:  As above      Objective:     Exam:  /66   Pulse 92   Temp 36.4 °C (97.6 °F)   Ht 1.727 m (5' 8\")   Wt 73.5 kg (162 lb)   SpO2 98%   BMI 24.63 kg/m²  Body mass index is 24.63 kg/m².    Gen: Alert and oriented, No apparent distress.  HEENT: PERRL, EOMI, NCAT, uvula midline, no exudates  Neck: Neck is supple without lymphadenopathy.  Lungs: Normal effort, CTA bilaterally, no wheezes, rhonchi, or rales  CV: Regular rate and rhythm. No murmurs, rubs, or gallops.  Abd:  Soft, Non-distended, non-tender  Ext: No clubbing, cyanosis, edema.  Skin: Right anterior bicep with a 2.5 cm x 1.3 cm scab. Surrounding area with pink, newly healing tissue, smaller than previous.      Labs: NA    Assessment & Plan:     78 y.o. male with the following:     Problem List Items Addressed This Visit       Skin lesion of left arm     - Pt to cover in " vaseline, wash BID.   - Try to wear shirts in which the cuff does not continue to irritate that area.  - Improving, shrinking in size, healthy pink tissue noted at margins, no ulcerations, very low concern for malignancy  - No biopsy performed today  - Pt not interested in derm referral today. Will consider biopsy and referral in future if desired.                No follow-ups on file.

## 2023-12-05 ENCOUNTER — OFFICE VISIT (OUTPATIENT)
Dept: MEDICAL GROUP | Facility: CLINIC | Age: 78
End: 2023-12-05
Payer: MEDICARE

## 2023-12-05 VITALS
OXYGEN SATURATION: 96 % | HEIGHT: 69 IN | TEMPERATURE: 98 F | SYSTOLIC BLOOD PRESSURE: 130 MMHG | WEIGHT: 162 LBS | BODY MASS INDEX: 23.99 KG/M2 | DIASTOLIC BLOOD PRESSURE: 80 MMHG | HEART RATE: 88 BPM

## 2023-12-05 DIAGNOSIS — C44.509 CANCER OF SKIN OF BACK: ICD-10-CM

## 2023-12-05 DIAGNOSIS — L98.9 SKIN LESION OF RIGHT ARM: ICD-10-CM

## 2023-12-05 PROCEDURE — 99213 OFFICE O/P EST LOW 20 MIN: CPT | Mod: GC

## 2023-12-05 PROCEDURE — 3075F SYST BP GE 130 - 139MM HG: CPT

## 2023-12-05 PROCEDURE — 3079F DIAST BP 80-89 MM HG: CPT

## 2023-12-05 ASSESSMENT — FIBROSIS 4 INDEX: FIB4 SCORE: 2.02

## 2023-12-05 NOTE — ASSESSMENT & PLAN NOTE
Again, does not appear to be cancerous. Evidence of healing. Marginally smaller than 2 months ago. Believe it likely to be poor perfusion to skin in that area. Biopsy continues to be deferred given reduction in size of ulcer and concern for worsening wound. Regardless, derm referral sent. Will defer further management to them.

## 2023-12-05 NOTE — ASSESSMENT & PLAN NOTE
Skin cancer. Basal vs squamous. Biopsy not performed today. Given size and non-healing arm wound will send patient to derm for work-up and treatment. Pt counseled on importance of following up ASAP. To call office if he doesn't receive referral information in 1-2 weeks or appointment with derm is multiple months away.

## 2023-12-05 NOTE — PROGRESS NOTES
"Subjective:     CC: Skin follow up    HPI:   Salvatore presents today with     Problem   Cancer of Skin of Back    - First noticed it 1 year ago. First time mentioned to MD  - Itching, no pain, sometimes oozes  - Hx of sun exposure  - 2.5 cm x 1-1.5 cm. Raised. Telangiectasias noted.     Skin Lesion of Right Arm    Chronic, 1 year. slowly improving. Smaller than previous  No evidence of infection, diabetes well controlled. No ongoing trauma to area.          Current Outpatient Medications Ordered in Epic   Medication Sig Dispense Refill    TRADJENTA 5 MG Tab tablet TAKE 1 TABLET BY MOUTH EVERY DAY (Patient not taking: Reported on 9/6/2023) 90 Tablet 4    atorvastatin (LIPITOR) 40 MG Tab TAKE 1 TABLET BY MOUTH EVERY EVENING 90 Tablet 3    digoxin (LANOXIN) 250 MCG Tab 1 po daily      linagliptin (TRADJENTA) 5 MG Tab tablet Take 1 Tablet by mouth every day. 90 Tablet 4    warfarin (COUMADIN) 2.5 MG Tab Take 2.5 mg by mouth every day.      lisinopril (PRINIVIL) 5 MG Tab Take 5 mg by mouth every bedtime.      tamsulosin (FLOMAX) 0.4 MG capsule Take 0.4 mg by mouth every bedtime.      metoprolol (LOPRESSOR) 25 MG TABS Take 1 Tab by mouth 2 Times a Day. 60 Tab 0     No current Epic-ordered facility-administered medications on file.       ROS:  As above      Objective:     Exam:  /80   Pulse 88   Temp 36.7 °C (98 °F)   Ht 1.753 m (5' 9\")   Wt 73.5 kg (162 lb)   SpO2 96%   BMI 23.92 kg/m²  Body mass index is 23.92 kg/m².    Gen: Alert and oriented, No apparent distress.  HEENT: PERRL, EOMI, NCAT, uvula midline, no exudates  Neck: Neck is supple without lymphadenopathy.  Lungs: Normal effort, CTA bilaterally, no wheezes, rhonchi, or rales  CV: Regular rate and rhythm. No murmurs, rubs, or gallops.  Abd:  Soft, Non-distended, non-tender  Ext: No clubbing, cyanosis, edema.  Skin: Right arm with a 1.5cm x 1 cm wound. Healing noted at edges. Right back shoulder with 2.5cm x 1 cm pearly raised lesion with " teleangiectasis. Some ulceration.     Labs: None performed today    Assessment & Plan:     78 y.o. male with the following:     Problem List Items Addressed This Visit       Skin lesion of right arm     Again, does not appear to be cancerous. Evidence of healing. Marginally smaller than 2 months ago. Believe it likely to be poor perfusion to skin in that area. Biopsy continues to be deferred given reduction in size of ulcer and concern for worsening wound. Regardless, derm referral sent. Will defer further management to them.         Cancer of skin of back     Skin cancer. Basal vs squamous. Biopsy not performed today. Given size and non-healing arm wound will send patient to derm for work-up and treatment. Pt counseled on importance of following up ASAP. To call office if he doesn't receive referral information in 1-2 weeks or appointment with derm is multiple months away.         Relevant Orders    Referral to Dermatology           No follow-ups on file.

## 2023-12-19 ENCOUNTER — HOSPITAL ENCOUNTER (OUTPATIENT)
Facility: MEDICAL CENTER | Age: 78
End: 2023-12-19
Payer: MEDICARE

## 2023-12-19 ENCOUNTER — OFFICE VISIT (OUTPATIENT)
Dept: MEDICAL GROUP | Facility: CLINIC | Age: 78
End: 2023-12-19
Payer: MEDICARE

## 2023-12-19 VITALS
RESPIRATION RATE: 16 BRPM | HEIGHT: 69 IN | OXYGEN SATURATION: 99 % | HEART RATE: 82 BPM | SYSTOLIC BLOOD PRESSURE: 92 MMHG | WEIGHT: 160 LBS | DIASTOLIC BLOOD PRESSURE: 62 MMHG | BODY MASS INDEX: 23.7 KG/M2 | TEMPERATURE: 97.7 F

## 2023-12-19 DIAGNOSIS — C44.509 CANCER OF SKIN OF BACK: ICD-10-CM

## 2023-12-19 LAB — PATHOLOGY CONSULT NOTE: NORMAL

## 2023-12-19 PROCEDURE — 3074F SYST BP LT 130 MM HG: CPT

## 2023-12-19 PROCEDURE — 11106 INCAL BX SKN SINGLE LES: CPT

## 2023-12-19 PROCEDURE — 3078F DIAST BP <80 MM HG: CPT

## 2023-12-19 PROCEDURE — 88313 SPECIAL STAINS GROUP 2: CPT

## 2023-12-19 PROCEDURE — 88342 IMHCHEM/IMCYTCHM 1ST ANTB: CPT

## 2023-12-19 PROCEDURE — 88341 IMHCHEM/IMCYTCHM EA ADD ANTB: CPT | Mod: 91

## 2023-12-19 PROCEDURE — 88305 TISSUE EXAM BY PATHOLOGIST: CPT

## 2023-12-19 ASSESSMENT — FIBROSIS 4 INDEX: FIB4 SCORE: 2.02

## 2023-12-20 NOTE — PROCEDURES
Performing Physician: Ras Giron MD  Supervising Physician: Reji Bella MD    PROCEDURE:   Excisional biopsy.  Performed in office today as patient was unable to get into Dermatologist and is requesting that we biopsy it.    LOCATION:  Right scapula.     PHYSICAL: 1.5cm x 2.5cm friable mass over right scapula.. No obvious melanocytic changes.      The area surrounding the skin lesion was prepared and draped in the  usual sterile manner with chloraprep. 8 cc of lidocaine 1% with epi was injected into the surrounding tissue. Anesthesia was achieved. The lesion was removed in a 3:1 length to width ratio measuring in total 8 cm long and 2-2.5cm wide.  Bleeding was controlled with hyfrecator and sutures. 8 interrupted 3-0 ethilon sutures were placed and 1 horizontal mattress 3-0 ethilon was placed. The area was cleaned and triple abx ointment was applied. The area was covered with a bandage.    The patient was counseled to not soak his wound for 1-2 weeks but is okay to shower within 24 hours. Pt to return to clinic next Friday for suture removal. ED and clinic return precautions given.    d return  precautions are given.

## 2023-12-29 ENCOUNTER — HOSPITAL ENCOUNTER (OUTPATIENT)
Facility: MEDICAL CENTER | Age: 78
End: 2023-12-29
Payer: MEDICARE

## 2023-12-29 ENCOUNTER — OFFICE VISIT (OUTPATIENT)
Dept: MEDICAL GROUP | Facility: CLINIC | Age: 78
End: 2023-12-29
Payer: MEDICARE

## 2023-12-29 VITALS
HEART RATE: 86 BPM | DIASTOLIC BLOOD PRESSURE: 77 MMHG | WEIGHT: 165 LBS | OXYGEN SATURATION: 95 % | SYSTOLIC BLOOD PRESSURE: 121 MMHG | BODY MASS INDEX: 24.44 KG/M2 | TEMPERATURE: 97.1 F | HEIGHT: 69 IN | RESPIRATION RATE: 14 BRPM

## 2023-12-29 DIAGNOSIS — E11.9 DM (DIABETES MELLITUS) (HCC): ICD-10-CM

## 2023-12-29 DIAGNOSIS — Z23 NEED FOR VACCINATION: ICD-10-CM

## 2023-12-29 DIAGNOSIS — L98.9 SKIN LESION OF RIGHT ARM: ICD-10-CM

## 2023-12-29 PROCEDURE — 88342 IMHCHEM/IMCYTCHM 1ST ANTB: CPT

## 2023-12-29 PROCEDURE — 11104 PUNCH BX SKIN SINGLE LESION: CPT

## 2023-12-29 PROCEDURE — 88305 TISSUE EXAM BY PATHOLOGIST: CPT

## 2023-12-29 PROCEDURE — 88341 IMHCHEM/IMCYTCHM EA ADD ANTB: CPT | Mod: 91

## 2023-12-29 ASSESSMENT — FIBROSIS 4 INDEX: FIB4 SCORE: 1.92

## 2023-12-29 NOTE — PROGRESS NOTES
Suture Removal:    9 sutures were visualized on the patient's right scapula. Wound was well healed in appearance. Sutures were removed. No steri-strips were placed. The patient tolerate the procedure well.       Skin Biopsy Procedure Note  PRE-OP DIAGNOSIS: Skin lesion, concerning for malignancy  POST-OP DIAGNOSIS: Skin lesion, concerning for malignancy   PROCEDURE: Skin Biopsy  Performing Physician: Ras Giron MD  Supervising Physician: Sherrie Park MD    Physical:  Lesion on right biceps around the size of a quarter. Not raised. Proximal edge demonstrating some scarring and healing by 2/2 intention.    PROCEDURE:   Punch (Size  6-0 )    Consent was obtained. The patient was counseled on risks and benefits including infection, poor cosmesis, bleeding, and further damage to surrounding structures. The area surrounding the skin lesion was prepared and cleaned with chlorhexidine. 1.5cc of 1% lidocaine without epi was used.  A portion of the lesion on the distal aspect was obtained using a 6-0 punch biopsy. Hemostasis was assured with 1 interrupted suture of 4-0 ethilon and further closed with steri-strips. A bandage and triple abx ointment were placed over the wound. The patient tolerated the procedure well. He was given return to clinic vs ED precautions.     Followup: He will come back in 1 week for suture removal. Standard post-procedure care is explained and return  precautions are given.

## 2023-12-30 LAB — PATHOLOGY CONSULT NOTE: NORMAL

## 2024-01-09 ENCOUNTER — OFFICE VISIT (OUTPATIENT)
Dept: MEDICAL GROUP | Facility: CLINIC | Age: 79
End: 2024-01-09
Payer: MEDICARE

## 2024-01-09 ENCOUNTER — TELEPHONE (OUTPATIENT)
Dept: MEDICAL GROUP | Facility: CLINIC | Age: 79
End: 2024-01-09

## 2024-01-09 VITALS
BODY MASS INDEX: 24.44 KG/M2 | WEIGHT: 165 LBS | HEIGHT: 69 IN | DIASTOLIC BLOOD PRESSURE: 64 MMHG | OXYGEN SATURATION: 98 % | HEART RATE: 72 BPM | SYSTOLIC BLOOD PRESSURE: 110 MMHG | TEMPERATURE: 97.2 F

## 2024-01-09 DIAGNOSIS — Z12.83 SKIN EXAM FOR MALIGNANT NEOPLASM: ICD-10-CM

## 2024-01-09 DIAGNOSIS — C44.612 BASAL CELL CARCINOMA (BCC) OF SKIN OF RIGHT UPPER EXTREMITY INCLUDING SHOULDER: ICD-10-CM

## 2024-01-09 PROCEDURE — 3074F SYST BP LT 130 MM HG: CPT

## 2024-01-09 PROCEDURE — 3078F DIAST BP <80 MM HG: CPT

## 2024-01-09 PROCEDURE — 99213 OFFICE O/P EST LOW 20 MIN: CPT | Mod: GE

## 2024-01-09 ASSESSMENT — FIBROSIS 4 INDEX: FIB4 SCORE: 1.92

## 2024-01-09 ASSESSMENT — PATIENT HEALTH QUESTIONNAIRE - PHQ9: CLINICAL INTERPRETATION OF PHQ2 SCORE: 0

## 2024-01-09 NOTE — TELEPHONE ENCOUNTER
----- Message from Ras Giron M.D. sent at 1/4/2024  9:39 AM PST -----  Regarding: Schedule this patient for a procedure with me  Good morning!    Can someone call this patient and let him know that the growth on his arm is a basal cell skin cancer? The good news is that these almost never go to other parts of the body. That being said, we should still remove it. Can we schedule this patient for a procedure (60 minutes) with me in the next couple of weeks? Thank you so much!    Best,    Ras Giron MD        ----- Message -----  From: Pradeep, Lab  Sent: 12/30/2023  12:28 AM PST  To: Ras Giron M.D.

## 2024-01-10 PROBLEM — C44.612 BASAL CELL CARCINOMA (BCC) OF SKIN OF RIGHT UPPER EXTREMITY INCLUDING SHOULDER: Status: ACTIVE | Noted: 2024-01-10

## 2024-01-10 PROBLEM — Z12.83 SKIN EXAM FOR MALIGNANT NEOPLASM: Status: ACTIVE | Noted: 2024-01-10

## 2024-01-10 NOTE — ASSESSMENT & PLAN NOTE
- Stitch removed today. Well healing biopsy site  - 2-3 sydnee cm largely circular, however, with irregular 1/2 cm offshoot  - Discussed removal in office with this patient vs referral. Pt prefers to just remove it in this office. Understands that given shape, size, and location, may be difficult for us to remove. Still would like to proceed. Pt scheduled for appointment next week.

## 2024-01-10 NOTE — PROGRESS NOTES
"Subjective:     CC:     HPI:   Salvatore presents today with     Problem   Basal Cell Carcinoma (Bcc) of Skin of Right Upper Extremity Including Shoulder    - Right arm  - Been present for 1 year.  - Biopsy performed at last visit. Consistent with basal cell carcinoma     Skin Exam for Malignant Neoplasm    - Full body skin exam performed today. See physical for details.         Current Outpatient Medications Ordered in Epic   Medication Sig Dispense Refill    TRADJENTA 5 MG Tab tablet TAKE 1 TABLET BY MOUTH EVERY DAY (Patient not taking: Reported on 9/6/2023) 90 Tablet 4    atorvastatin (LIPITOR) 40 MG Tab TAKE 1 TABLET BY MOUTH EVERY EVENING 90 Tablet 3    digoxin (LANOXIN) 250 MCG Tab 1 po daily      linagliptin (TRADJENTA) 5 MG Tab tablet Take 1 Tablet by mouth every day. 90 Tablet 4    warfarin (COUMADIN) 2.5 MG Tab Take 2.5 mg by mouth every day. (Patient not taking: Reported on 12/29/2023)      lisinopril (PRINIVIL) 5 MG Tab Take 5 mg by mouth every bedtime.      tamsulosin (FLOMAX) 0.4 MG capsule Take 0.4 mg by mouth every bedtime.      metoprolol (LOPRESSOR) 25 MG TABS Take 1 Tab by mouth 2 Times a Day. 60 Tab 0     No current Epic-ordered facility-administered medications on file.       Objective:     Exam:  /64   Pulse 72   Temp 36.2 °C (97.2 °F)   Ht 1.753 m (5' 9\")   Wt 74.8 kg (165 lb)   SpO2 98%   BMI 24.37 kg/m²  Body mass index is 24.37 kg/m².    Gen: Alert and oriented, No apparent distress.  HEENT: PERRL, EOMI, NCAT, uvula midline, no exudates  Neck: Neck is supple without lymphadenopathy.  Lungs: Normal effort, CTA bilaterally, no wheezes, rhonchi, or rales  CV: Regular rate and rhythm. No murmurs, rubs, or gallops.  Abd:  Soft, Non-distended, non-tender  Ext: No clubbing, cyanosis, edema.  Skin:  Right bicep with quarter sized non-healing lesion, slightly irregular in shape. Well healing scar on right scapula -6-7cm long. Many scattered Sk's on patient trunk and a few on scalp. Many " small cherry hemangioma's on trunk.      Labs: Reviewed pathology with patient.    Assessment & Plan:     78 y.o. male with the following:     Problem List Items Addressed This Visit       Basal cell carcinoma (BCC) of skin of right upper extremity including shoulder     - Stitch removed today. Well healing biopsy site  - 2-3 sydnee cm largely circular, however, with irregular 1/2 cm offshoot  - Discussed removal in office with this patient vs referral. Pt prefers to just remove it in this office. Understands that given shape, size, and location, may be difficult for us to remove. Still would like to proceed. Pt scheduled for appointment next week.         Skin exam for malignant neoplasm     Exam somewhat limited on scalp due to hair. No malignancies noted aside from BCC and previous scar on back. Pt to return in 6 months for repeat skin check.                No follow-ups on file.

## 2024-01-10 NOTE — ASSESSMENT & PLAN NOTE
Exam somewhat limited on scalp due to hair. No malignancies noted aside from BCC and previous scar on back. Pt to return in 6 months for repeat skin check.

## 2024-01-16 ENCOUNTER — HOSPITAL ENCOUNTER (OUTPATIENT)
Facility: MEDICAL CENTER | Age: 79
End: 2024-01-16
Payer: MEDICARE

## 2024-01-16 ENCOUNTER — OFFICE VISIT (OUTPATIENT)
Dept: MEDICAL GROUP | Facility: CLINIC | Age: 79
End: 2024-01-16
Payer: MEDICARE

## 2024-01-16 VITALS
TEMPERATURE: 98 F | HEART RATE: 98 BPM | DIASTOLIC BLOOD PRESSURE: 94 MMHG | SYSTOLIC BLOOD PRESSURE: 138 MMHG | WEIGHT: 165 LBS | BODY MASS INDEX: 24.44 KG/M2 | HEIGHT: 69 IN | OXYGEN SATURATION: 95 %

## 2024-01-16 DIAGNOSIS — L57.0 ACTINIC KERATOSIS: ICD-10-CM

## 2024-01-16 DIAGNOSIS — C44.612 BASAL CELL CARCINOMA (BCC) OF SKIN OF RIGHT UPPER EXTREMITY INCLUDING SHOULDER: ICD-10-CM

## 2024-01-16 LAB — PATHOLOGY CONSULT NOTE: NORMAL

## 2024-01-16 PROCEDURE — 3080F DIAST BP >= 90 MM HG: CPT

## 2024-01-16 PROCEDURE — 11403 EXC TR-EXT B9+MARG 2.1-3CM: CPT

## 2024-01-16 PROCEDURE — 3075F SYST BP GE 130 - 139MM HG: CPT

## 2024-01-16 PROCEDURE — 88305 TISSUE EXAM BY PATHOLOGIST: CPT

## 2024-01-16 ASSESSMENT — FIBROSIS 4 INDEX: FIB4 SCORE: 1.92

## 2024-01-16 NOTE — PROGRESS NOTES
Skin Biopsy Procedure Note    PRE-OP DIAGNOSIS: Basal cell carcinoma  POST-OP DIAGNOSIS: Same   PROCEDURE: Skin biopsy  Performing Physician: Ras Giron MD   Supervising Physician: Lane Manley MD    PROCEDURE TYPE:     Excisional Biopsy    PHYSICAL EXAM:    Skin: Irregularly shaped lesion on his right upper bicep. Circular portion slight smaller than quarter. Small tail extending medially. In total 2 cm long. 1 cm wide.     PROCEDURE:    The area surrounding the skin lesion was marked in an elliptical shape around 4 cm x 1.5 cm and then cleaned with chlorhexidine and draped in the usual sterile manner. 4 ml of 1% with epi was injected around the lesion to obtain desired anesthetic effect. A 10 blade was used. Skin was excised just beneath the dermal layer in an elliptical fashion. The wound was closed with 4-0 prolene. 4 vertical mattress sutures and 3 interrupted sutures were placed. Hemostasis was assured. Antibiotic ointment was applied and area covered with a bandage. The patient tolerated  the procedure well. Return precautions were given. The patient is to follow up in 10-14 days for suture removal and wound check. Pathology results will be communicated to the patient via epic or telephone.

## 2024-01-16 NOTE — PROCEDURES
Indication: Premalignant lesion    Verbal consent obtained. Risks and benefits discussed. Pt would like to proceed.    Lesion over right deltoid. 1/2cm flaky scaly lesion. Recurrent. Consistent with actinic keratosis. Cryo-ablated. Freeze thaw x 3    Lesion on back of left tricep. 1/2 cm. Flaky scaly lesion. Recurrent. Consistent with actinic keratosis. Cryo-ablated. Freeze thaw x 3.    Pt tolerated both procedures well.

## 2024-01-18 NOTE — RESULT ENCOUNTER NOTE
Will discuss results with patient when he gets stitches removed. It appears that we removed entire lesion.

## 2024-01-30 ENCOUNTER — OFFICE VISIT (OUTPATIENT)
Dept: MEDICAL GROUP | Facility: CLINIC | Age: 79
End: 2024-01-30
Payer: MEDICARE

## 2024-01-30 VITALS
BODY MASS INDEX: 24.22 KG/M2 | WEIGHT: 164 LBS | HEART RATE: 82 BPM | TEMPERATURE: 98 F | SYSTOLIC BLOOD PRESSURE: 117 MMHG | OXYGEN SATURATION: 98 % | DIASTOLIC BLOOD PRESSURE: 66 MMHG

## 2024-01-30 DIAGNOSIS — E11.9 DM (DIABETES MELLITUS) (HCC): ICD-10-CM

## 2024-01-30 DIAGNOSIS — Z23 NEED FOR VACCINATION: Primary | ICD-10-CM

## 2024-01-30 PROCEDURE — G0008 ADMIN INFLUENZA VIRUS VAC: HCPCS

## 2024-01-30 PROCEDURE — 3074F SYST BP LT 130 MM HG: CPT

## 2024-01-30 PROCEDURE — 3078F DIAST BP <80 MM HG: CPT

## 2024-01-30 PROCEDURE — 90662 IIV NO PRSV INCREASED AG IM: CPT

## 2024-01-30 PROCEDURE — 99999 PR NO CHARGE: CPT

## 2024-01-30 ASSESSMENT — FIBROSIS 4 INDEX: FIB4 SCORE: 1.92

## 2024-01-30 NOTE — PROGRESS NOTES
Suture removal      Wound on right bicep well healing.  No evidence of erythema.  No discharge.  7 sutures visualized.  6 sutures removed.  Unable to remove 7 suture due to scar formation and extended period of time prior to removal.  Top of suture removed, base left in skin. Discussed with patient that this suture will likely not cause any problems.  Was left in as taking it out would cause more pain and expose the patient to unnecessary interventions/possible infection at this point in time.  Will consider removing if it starts to bother the patient. Pt in agreement with this plan.

## 2024-03-12 RX ORDER — LINAGLIPTIN 5 MG/1
5 TABLET, FILM COATED ORAL DAILY
Qty: 90 TABLET | Refills: 4 | Status: SHIPPED | OUTPATIENT
Start: 2024-03-12

## 2024-04-10 ENCOUNTER — OFFICE VISIT (OUTPATIENT)
Dept: MEDICAL GROUP | Facility: CLINIC | Age: 79
End: 2024-04-10
Payer: MEDICARE

## 2024-04-10 VITALS
OXYGEN SATURATION: 94 % | HEART RATE: 82 BPM | SYSTOLIC BLOOD PRESSURE: 112 MMHG | DIASTOLIC BLOOD PRESSURE: 64 MMHG | WEIGHT: 163 LBS | BODY MASS INDEX: 24.71 KG/M2 | TEMPERATURE: 97.6 F | HEIGHT: 68 IN

## 2024-04-10 DIAGNOSIS — C44.622 SQUAMOUS CELL CANCER OF SKIN OF RIGHT SHOULDER: ICD-10-CM

## 2024-04-10 DIAGNOSIS — E11.9 TYPE 2 DIABETES MELLITUS WITHOUT COMPLICATION, WITHOUT LONG-TERM CURRENT USE OF INSULIN (HCC): ICD-10-CM

## 2024-04-10 DIAGNOSIS — C44.612 BASAL CELL CARCINOMA (BCC) OF SKIN OF RIGHT UPPER EXTREMITY INCLUDING SHOULDER: ICD-10-CM

## 2024-04-10 LAB
HBA1C MFR BLD: 6.5 % (ref ?–5.8)
POCT INT CON NEG: NEGATIVE
POCT INT CON POS: POSITIVE

## 2024-04-10 PROCEDURE — 83036 HEMOGLOBIN GLYCOSYLATED A1C: CPT

## 2024-04-10 PROCEDURE — 3078F DIAST BP <80 MM HG: CPT

## 2024-04-10 PROCEDURE — 99212 OFFICE O/P EST SF 10 MIN: CPT | Mod: GE

## 2024-04-10 PROCEDURE — 3074F SYST BP LT 130 MM HG: CPT

## 2024-04-10 ASSESSMENT — FIBROSIS 4 INDEX: FIB4 SCORE: 1.92

## 2024-04-10 NOTE — PROGRESS NOTES
"Subjective:     CC: Skin    HPI:   Salvatore presents today with    Problem   Basal Cell Carcinoma (Bcc) of Skin of Right Upper Extremity Including Shoulder    Consistent with basal cell carcinoma. Excised with margins in Spring 2024.     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  - Excised in Spring 2024.       Skin Lesion of Right Arm    Excised lesion basal cell carcinoma. Excised Spring 2024. Margins obtained.          Current Outpatient Medications Ordered in Epic   Medication Sig Dispense Refill    linagliptin (TRADJENTA) 5 MG Tab tablet Take 1 Tablet by mouth every day. 90 Tablet 4    atorvastatin (LIPITOR) 40 MG Tab TAKE 1 TABLET BY MOUTH EVERY EVENING 90 Tablet 3    digoxin (LANOXIN) 250 MCG Tab 1 po daily      lisinopril (PRINIVIL) 5 MG Tab Take 5 mg by mouth every bedtime.      tamsulosin (FLOMAX) 0.4 MG capsule Take 0.4 mg by mouth every bedtime.      metoprolol (LOPRESSOR) 25 MG TABS Take 1 Tab by mouth 2 Times a Day. 60 Tab 0    warfarin (COUMADIN) 2.5 MG Tab Take 2.5 mg by mouth every day. (Patient not taking: Reported on 12/29/2023)       No current [facility]-ordered facility-administered medications on file.       Objective:     Exam:  /64   Pulse 82   Temp 36.4 °C (97.6 °F)   Ht 1.727 m (5' 8\")   Wt 73.9 kg (163 lb)   SpO2 94%   BMI 24.78 kg/m²  Body mass index is 24.78 kg/m².    Gen: Alert and oriented, No apparent distress.  HEENT: PERRL, EOMI, NCAT, uvula midline, no exudates  Neck: Neck is supple without lymphadenopathy.  Lungs: Normal effort, CTA bilaterally, no wheezes, rhonchi, or rales  CV: Regular rate and rhythm. No murmurs, rubs, or gallops.  Abd:  Soft, Non-distended, non-tender  Ext: No clubbing, cyanosis, edema.  Skin: No rashes, no erythema    Labs: POCT a1c    Assessment & Plan:     78 y.o. male with the following:     Problem List Items Addressed This Visit       DM (diabetes mellitus) (HCC)    Relevant Orders    POCT  A1C (Completed)    Squamous " cell cancer of skin of right shoulder     - No evidence of recurrence.          Basal cell carcinoma (BCC) of skin of right upper extremity including shoulder     Well healing. No evidence of recurrence            POCT A1c performed today    No follow-ups on file.

## 2024-05-01 NOTE — TELEPHONE ENCOUNTER
Received request via: Pharmacy    Was the patient seen in the last year in this department? Yes    Does the patient have an active prescription (recently filled or refills available) for medication(s) requested? No    Pharmacy Name:   [id number] - [place], [place] - [address] [id number]

## 2024-05-03 RX ORDER — ATORVASTATIN CALCIUM 40 MG/1
40 TABLET, FILM COATED ORAL NIGHTLY
Qty: 90 TABLET | Refills: 3 | Status: SHIPPED | OUTPATIENT
Start: 2024-05-03

## 2024-06-04 ENCOUNTER — APPOINTMENT (OUTPATIENT)
Dept: MEDICAL GROUP | Facility: CLINIC | Age: 79
End: 2024-06-04
Payer: MEDICARE

## 2024-06-04 VITALS
BODY MASS INDEX: 23.7 KG/M2 | HEART RATE: 64 BPM | WEIGHT: 160 LBS | DIASTOLIC BLOOD PRESSURE: 76 MMHG | SYSTOLIC BLOOD PRESSURE: 118 MMHG | HEIGHT: 69 IN | OXYGEN SATURATION: 96 % | TEMPERATURE: 97.2 F

## 2024-06-04 DIAGNOSIS — E11.9 TYPE 2 DIABETES MELLITUS WITHOUT COMPLICATION, WITHOUT LONG-TERM CURRENT USE OF INSULIN (HCC): ICD-10-CM

## 2024-06-04 DIAGNOSIS — C44.622 SQUAMOUS CELL CANCER OF SKIN OF RIGHT SHOULDER: ICD-10-CM

## 2024-06-04 PROCEDURE — 3074F SYST BP LT 130 MM HG: CPT | Mod: GE

## 2024-06-04 PROCEDURE — 3078F DIAST BP <80 MM HG: CPT | Mod: GE

## 2024-06-04 PROCEDURE — 99213 OFFICE O/P EST LOW 20 MIN: CPT | Mod: GE

## 2024-06-04 ASSESSMENT — FIBROSIS 4 INDEX: FIB4 SCORE: 2.03

## 2024-06-04 NOTE — PROGRESS NOTES
"Subjective:     CC:     HPI:   Salvatore presents today as I am leaving this clinic and he wanted to say goodbye. He is also here for a skin check on his right shoulder.    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  - Excised in Winter 2024. Margins obtained.  - 6 month check without growth  - Will require yearly follow up           Current Outpatient Medications Ordered in Epic   Medication Sig Dispense Refill    atorvastatin (LIPITOR) 40 MG Tab Take 1 Tablet by mouth every evening. 90 Tablet 3    linagliptin (TRADJENTA) 5 MG Tab tablet Take 1 Tablet by mouth every day. 90 Tablet 4    digoxin (LANOXIN) 250 MCG Tab 1 po daily      lisinopril (PRINIVIL) 5 MG Tab Take 5 mg by mouth every bedtime.      tamsulosin (FLOMAX) 0.4 MG capsule Take 0.4 mg by mouth every bedtime.      metoprolol (LOPRESSOR) 25 MG TABS Take 1 Tab by mouth 2 Times a Day. 60 Tab 0    warfarin (COUMADIN) 2.5 MG Tab Take 2.5 mg by mouth every day. (Patient not taking: Reported on 12/29/2023)       No current [facility]-ordered facility-administered medications on file.       Objective:     Exam:  /76   Pulse 64   Temp 36.2 °C (97.2 °F)   Ht 1.753 m (5' 9\")   Wt 72.6 kg (160 lb)   SpO2 96%   BMI 23.63 kg/m²  Body mass index is 23.63 kg/m².    Gen: Alert and oriented, No apparent distress.  HEENT: PERRL, EOMI, NCAT, uvula midline, no exudates  Neck: Neck is supple without lymphadenopathy.  Lungs: Normal effort, CTA bilaterally, no wheezes, rhonchi, or rales  CV: Regular rate and rhythm. No murmurs, rubs, or gallops.  Abd:  Soft, Non-distended, non-tender  Ext: No clubbing, cyanosis, edema.  Skin: No rashes, no erythema. Two well healed scars on right bicep and right back of shoulder. No evidence of regrowth.    Labs: None    Assessment & Plan:     79 y.o. male with the following:     Problem List Items Addressed This Visit       DM (diabetes mellitus) (HCC)    Squamous cell cancer of skin of right shoulder "     No evidence of re-growth at 6 month check up. 1 year check up indicated.            No follow-ups on file.

## 2024-06-14 ENCOUNTER — OFFICE VISIT (OUTPATIENT)
Dept: MEDICAL GROUP | Facility: CLINIC | Age: 79
End: 2024-06-14
Payer: MEDICARE

## 2024-06-14 VITALS
HEIGHT: 69 IN | OXYGEN SATURATION: 94 % | WEIGHT: 161 LBS | HEART RATE: 88 BPM | TEMPERATURE: 98.5 F | SYSTOLIC BLOOD PRESSURE: 132 MMHG | BODY MASS INDEX: 23.85 KG/M2 | DIASTOLIC BLOOD PRESSURE: 88 MMHG

## 2024-06-14 DIAGNOSIS — N30.01 ACUTE CYSTITIS WITH HEMATURIA: ICD-10-CM

## 2024-06-14 LAB
APPEARANCE UR: CLEAR
BILIRUB UR STRIP-MCNC: NEGATIVE MG/DL
COLOR UR AUTO: YELLOW
GLUCOSE UR STRIP.AUTO-MCNC: NEGATIVE MG/DL
KETONES UR STRIP.AUTO-MCNC: NEGATIVE MG/DL
LEUKOCYTE ESTERASE UR QL STRIP.AUTO: NORMAL
NITRITE UR QL STRIP.AUTO: NEGATIVE
PH UR STRIP.AUTO: 6 [PH] (ref 5–8)
PROT UR QL STRIP: NORMAL MG/DL
RBC UR QL AUTO: NORMAL
SP GR UR STRIP.AUTO: 1.01
UROBILINOGEN UR STRIP-MCNC: 0.2 MG/DL

## 2024-06-14 PROCEDURE — 81002 URINALYSIS NONAUTO W/O SCOPE: CPT | Performed by: STUDENT IN AN ORGANIZED HEALTH CARE EDUCATION/TRAINING PROGRAM

## 2024-06-14 PROCEDURE — 3079F DIAST BP 80-89 MM HG: CPT | Performed by: STUDENT IN AN ORGANIZED HEALTH CARE EDUCATION/TRAINING PROGRAM

## 2024-06-14 PROCEDURE — 99213 OFFICE O/P EST LOW 20 MIN: CPT | Performed by: STUDENT IN AN ORGANIZED HEALTH CARE EDUCATION/TRAINING PROGRAM

## 2024-06-14 PROCEDURE — 3075F SYST BP GE 130 - 139MM HG: CPT | Performed by: STUDENT IN AN ORGANIZED HEALTH CARE EDUCATION/TRAINING PROGRAM

## 2024-06-14 RX ORDER — NITROFURANTOIN 25; 75 MG/1; MG/1
100 CAPSULE ORAL 2 TIMES DAILY
Qty: 14 CAPSULE | Refills: 0 | Status: SHIPPED | OUTPATIENT
Start: 2024-06-14 | End: 2024-06-21

## 2024-06-14 ASSESSMENT — FIBROSIS 4 INDEX: FIB4 SCORE: 2.38

## 2024-06-14 NOTE — PROGRESS NOTES
"[facility] OFFICE VISIT    Date: 6/14/2024    MRN: [id number]  Patient ID: Salvatore Bowen    SUBJECTIVE:  Salvatore Bowen is a 79 y.o. male here for burning with urination and urine cloudiness.  Patient reports symptoms began several days ago.  Has had multiple urinary tract infections in the past, typically 1 to 2/year, and states that this feels similar.  States that pain is mild, but is peeing frequently.  Has been drinking cranberry juice and water more frequently to attempt to flush urine.  Has taken antibiotics without issue for this in the past which is cleared up.  Patient does follow with nephrology, but cannot recall whether or not he has had imaging of the renal system to determine whether there may be some anomaly which causes his recurrent urinary tract infections.  Denies possibility of sexually transmitted infection at this time.    PMHx/PSHx:  Past Medical History:   Diagnosis Date    Gastric ulcer 2012    Hypertension 08/03/2020    Seizure disorder ([facility]) 1983    r/t stroke in 1983    Stroke ([facility]) 1983    no residual weakness     Patient Active Problem List   Diagnosis    Atrial fibrillation ([facility])    Hypertension    DM (diabetes mellitus) ([facility])    Preventative health care    Skin lesion of right arm    Squamous cell cancer of skin of right shoulder    Basal cell carcinoma (BCC) of skin of right upper extremity including shoulder    Skin exam for malignant neoplasm    Actinic keratosis     Past Surgical History:   Procedure Laterality Date    INGUINAL HERNIA LAPAROSCOPIC Right 8/6/2020    Procedure: REPAIR, HERNIA, INGUINAL, LAPAROSCOPIC;  Surgeon: Brad Camarillo M.D.;  Location: SURGERY [facility];  Service: Vascular    OTHER ABDOMINAL SURGERY  2019    hernia    OTHER NEUROLOGICAL SURG  1983    \"Put a plate in my skull r/t stroke\"       Allergies: Patient has no known allergies.    OBJECTIVE:  Vitals:    06/14/24 1600   BP: 132/88   Pulse: 88   Temp: 36.9 " "°C (98.5 °F)   SpO2: 94%     Vitals:    06/14/24 1600   BP: 132/88   Weight: 73 kg (161 lb)   Height: 1.753 m (5' 9\")       Physical Examination:  General: Well appearing male in no acute distress, resting on arrival to room  HEENT: Normocephalic, atraumatic, EOMI  Cardiovascular: Skin pink, no acrocyanosis  Pulmonary: No tachypnea or retractions  Abdominal: Non-tender to palpation, no guarding, rigidity, or distension, no CVA tenderness  Extremities: Moves all spontaneously  Neurological: Alert and oriented    ASSESSMENT & PLAN:  Salvatore Bowen is a 79 y.o. male here for evaluation of burning with urination, found to have probable acute cystitis with hematuria.    1. Acute cystitis with hematuria  POCT Urinalysis    nitrofurantoin (MACROBID) 100 MG Cap          Orders Placed This Encounter    POCT Urinalysis    nitrofurantoin (MACROBID) 100 MG Cap       # Acute cystitis with hematuria  Point-of-care urinalysis demonstrating moderate blood, protein, and positive leukocyte Estrace, most likely indicating simple cystitis.  At this time have prescribed nitrofurantoin 100 mg oral twice daily for a 7-day course.  Medication sent to pharmacy of choice.  Discussed with patient that in the setting of recurrent UTI, it might be reasonable to undergo renal system evaluation through imaging in order to determine whether any anatomic structures are causing recurrent infections.  Patient states that he would prefer to do this through his nephrologist, and will discuss this matter with them.  Strict return precautions discussed.  Patient will follow-up in approximately 3 weeks to reestablish with new provider in this clinic, at which time we can recheck to confirm resolution of hematuria.  Patient verbalized understanding and agreement with plan of care.    Aaron Beebe M.D.         "

## 2024-07-08 ENCOUNTER — APPOINTMENT (OUTPATIENT)
Dept: MEDICAL GROUP | Facility: CLINIC | Age: 79
End: 2024-07-08
Payer: MEDICARE

## 2024-07-08 VITALS
BODY MASS INDEX: 23.7 KG/M2 | TEMPERATURE: 97.5 F | WEIGHT: 160 LBS | HEART RATE: 82 BPM | DIASTOLIC BLOOD PRESSURE: 73 MMHG | OXYGEN SATURATION: 95 % | HEIGHT: 69 IN | SYSTOLIC BLOOD PRESSURE: 118 MMHG | RESPIRATION RATE: 18 BRPM

## 2024-07-08 DIAGNOSIS — Z23 NEED FOR VACCINATION: ICD-10-CM

## 2024-07-08 DIAGNOSIS — Z53.20 SCREENING FOR HEPATITIS C DECLINED: ICD-10-CM

## 2024-07-08 DIAGNOSIS — E11.9 DM (DIABETES MELLITUS) (HCC): ICD-10-CM

## 2024-07-08 DIAGNOSIS — Z76.89 ESTABLISHING CARE WITH NEW DOCTOR, ENCOUNTER FOR: ICD-10-CM

## 2024-07-08 DIAGNOSIS — I48.91 ATRIAL FIBRILLATION, UNSPECIFIED TYPE (HCC): ICD-10-CM

## 2024-07-08 DIAGNOSIS — Z23 NEED FOR TDAP VACCINATION: ICD-10-CM

## 2024-07-08 PROCEDURE — 90471 IMMUNIZATION ADMIN: CPT | Mod: GE

## 2024-07-08 PROCEDURE — 90715 TDAP VACCINE 7 YRS/> IM: CPT | Mod: GE

## 2024-07-08 PROCEDURE — 99213 OFFICE O/P EST LOW 20 MIN: CPT | Mod: 25,GE

## 2024-07-08 RX ORDER — ATORVASTATIN CALCIUM 40 MG/1
40 TABLET, FILM COATED ORAL NIGHTLY
Qty: 90 TABLET | Refills: 3 | Status: SHIPPED | OUTPATIENT
Start: 2024-07-08

## 2024-07-08 ASSESSMENT — FIBROSIS 4 INDEX: FIB4 SCORE: 2.38

## 2024-10-17 ENCOUNTER — OFFICE VISIT (OUTPATIENT)
Dept: MEDICAL GROUP | Facility: CLINIC | Age: 79
End: 2024-10-17
Payer: MEDICARE

## 2024-10-17 VITALS
TEMPERATURE: 96.8 F | BODY MASS INDEX: 23.99 KG/M2 | SYSTOLIC BLOOD PRESSURE: 120 MMHG | RESPIRATION RATE: 16 BRPM | HEIGHT: 69 IN | DIASTOLIC BLOOD PRESSURE: 75 MMHG | HEART RATE: 74 BPM | WEIGHT: 162 LBS | OXYGEN SATURATION: 98 %

## 2024-10-17 DIAGNOSIS — Z23 NEED FOR VACCINATION: ICD-10-CM

## 2024-10-17 DIAGNOSIS — Z11.59 ENCOUNTER FOR HEPATITIS C SCREENING TEST FOR LOW RISK PATIENT: ICD-10-CM

## 2024-10-17 DIAGNOSIS — E11.9 DM (DIABETES MELLITUS) (HCC): ICD-10-CM

## 2024-10-17 PROCEDURE — G0008 ADMIN INFLUENZA VIRUS VAC: HCPCS | Mod: GC

## 2024-10-17 PROCEDURE — 99214 OFFICE O/P EST MOD 30 MIN: CPT | Mod: 25,GC

## 2024-10-17 PROCEDURE — 90662 IIV NO PRSV INCREASED AG IM: CPT | Mod: GC

## 2024-10-17 RX ORDER — TAMSULOSIN HYDROCHLORIDE 0.4 MG/1
0.4 CAPSULE ORAL
Qty: 100 CAPSULE | Refills: 3 | Status: SHIPPED | OUTPATIENT
Start: 2024-10-17

## 2024-10-17 ASSESSMENT — PAIN SCALES - GENERAL: PAINLEVEL: NO PAIN

## 2024-10-17 ASSESSMENT — FIBROSIS 4 INDEX: FIB4 SCORE: 2.39

## 2024-10-21 RX ORDER — TAMSULOSIN HYDROCHLORIDE 0.4 MG/1
CAPSULE ORAL
Qty: 30 CAPSULE | Refills: 2 | Status: SHIPPED | OUTPATIENT
Start: 2024-10-21

## 2024-10-30 ENCOUNTER — APPOINTMENT (OUTPATIENT)
Dept: MEDICAL GROUP | Facility: CLINIC | Age: 79
End: 2024-10-30
Payer: MEDICARE

## 2024-12-27 ENCOUNTER — OFFICE VISIT (OUTPATIENT)
Dept: MEDICAL GROUP | Facility: CLINIC | Age: 79
End: 2024-12-27
Payer: MEDICARE

## 2024-12-27 ENCOUNTER — HOSPITAL ENCOUNTER (OUTPATIENT)
Facility: MEDICAL CENTER | Age: 79
End: 2024-12-27
Payer: MEDICARE

## 2024-12-27 VITALS
BODY MASS INDEX: 24.44 KG/M2 | DIASTOLIC BLOOD PRESSURE: 61 MMHG | OXYGEN SATURATION: 95 % | TEMPERATURE: 98.4 F | SYSTOLIC BLOOD PRESSURE: 113 MMHG | HEART RATE: 94 BPM | WEIGHT: 165 LBS | HEIGHT: 69 IN

## 2024-12-27 DIAGNOSIS — R30.0 DYSURIA: ICD-10-CM

## 2024-12-27 LAB
APPEARANCE UR: NORMAL
BILIRUB UR STRIP-MCNC: NORMAL MG/DL
COLOR UR AUTO: YELLOW
GLUCOSE UR STRIP.AUTO-MCNC: 500 MG/DL
KETONES UR STRIP.AUTO-MCNC: NORMAL MG/DL
LEUKOCYTE ESTERASE UR QL STRIP.AUTO: NORMAL
NITRITE UR QL STRIP.AUTO: NORMAL
PH UR STRIP.AUTO: 5.5 [PH] (ref 5–8)
PROT UR QL STRIP: 100 MG/DL
RBC UR QL AUTO: NORMAL
SP GR UR STRIP.AUTO: 1.02
UROBILINOGEN UR STRIP-MCNC: 0.2 MG/DL

## 2024-12-27 PROCEDURE — 87086 URINE CULTURE/COLONY COUNT: CPT

## 2024-12-27 PROCEDURE — 3074F SYST BP LT 130 MM HG: CPT | Mod: GC

## 2024-12-27 PROCEDURE — 99213 OFFICE O/P EST LOW 20 MIN: CPT | Mod: GE

## 2024-12-27 PROCEDURE — 81002 URINALYSIS NONAUTO W/O SCOPE: CPT | Mod: GC

## 2024-12-27 PROCEDURE — 87077 CULTURE AEROBIC IDENTIFY: CPT

## 2024-12-27 PROCEDURE — 87186 SC STD MICRODIL/AGAR DIL: CPT

## 2024-12-27 PROCEDURE — 3078F DIAST BP <80 MM HG: CPT | Mod: GC

## 2024-12-27 RX ORDER — NITROFURANTOIN 25; 75 MG/1; MG/1
100 CAPSULE ORAL 2 TIMES DAILY
Qty: 14 CAPSULE | Refills: 0 | Status: SHIPPED | OUTPATIENT
Start: 2024-12-27 | End: 2025-01-03

## 2024-12-27 ASSESSMENT — FIBROSIS 4 INDEX: FIB4 SCORE: 2.39

## 2024-12-27 NOTE — PROGRESS NOTES
"    SUBJECTIVE:     CC:   Chief Complaint   Patient presents with    Painful Urination     Burning with urination for a couple days         HPI:   Salvatore presents today complains of burinng sensation with urination and cloudy urine for the past 2-3 days. Patient states he has drank a 1.5 gallon of cranberry juice, cleared it up but did not last. Patient reports that he tries to drink up to 64oz of water daily. Patient denies any abdominal pain and blood in the urine. Patient denies fever, chills, n/v/d. Patient denies any medication allergies.        Past Medical History:  Past Medical History:   Diagnosis Date    Hypertension 08/03/2020    Gastric ulcer 2012    Seizure disorder (HCC) 1983    r/t stroke in 1983    Stroke (HCC) 1983    no residual weakness     Patient Active Problem List:  Patient Active Problem List   Diagnosis    Atrial fibrillation (HCC)    Hypertension    DM (diabetes mellitus) (HCC)    Preventative health care    Skin lesion of right arm    Squamous cell cancer of skin of right shoulder    Basal cell carcinoma (BCC) of skin of right upper extremity including shoulder    Skin exam for malignant neoplasm    Actinic keratosis    Need for Tdap vaccination    Establishing care with new doctor, encounter for    Screening for hepatitis C declined     Surgical History:  Past Surgical History:   Procedure Laterality Date    INGUINAL HERNIA LAPAROSCOPIC Right 8/6/2020    Procedure: REPAIR, HERNIA, INGUINAL, LAPAROSCOPIC;  Surgeon: Brad Camarillo M.D.;  Location: SURGERY [facility];  Service: Vascular    OTHER ABDOMINAL SURGERY  2019    hernia    OTHER NEUROLOGICAL SURG  1983    \"Put a plate in my skull r/t stroke\"     Family History:  No family history on file.    Social History:  Social History     Tobacco Use    Smoking status: Never    Smokeless tobacco: Never   Vaping Use    Vaping status: Never Used   Substance Use Topics    Alcohol use: Yes     Comment: occ    Drug use: Yes     Types: " "Inhaled     Comment: methamphetamin (last used 4 yrs ago)     Medications:  Current Outpatient Medications on File Prior to Visit   Medication Sig Dispense Refill    tamsulosin (FLOMAX) 0.4 MG capsule TAKE 1 CAPSULE(0.4 MG) BY MOUTH DAILY 30 Capsule 2    tamsulosin (FLOMAX) 0.4 MG capsule Take 1 Capsule by mouth at bedtime. 100 Capsule 3    atorvastatin (LIPITOR) 40 MG Tab Take 1 Tablet by mouth every evening. 90 Tablet 3    linagliptin (TRADJENTA) 5 MG Tab tablet Take 1 Tablet by mouth every day. 90 Tablet 4    digoxin (LANOXIN) 250 MCG Tab 1 po daily      warfarin (COUMADIN) 2.5 MG Tab Take 2.5 mg by mouth every day.      lisinopril (PRINIVIL) 5 MG Tab Take 5 mg by mouth every bedtime.      metoprolol (LOPRESSOR) 25 MG TABS Take 1 Tab by mouth 2 Times a Day. 60 Tab 0     No current facility-administered medications on file prior to visit.       No Known Allergies      ROS:   Gen: no fevers/chills, no changes in weight  Eyes: no changes in vision  ENT: no changes in hearing  Pulm: no sob, no cough  CV: no chest pain, no palpitations  GI: no nausea/vomiting, no diarrhea  MSk: no myalgias  Skin: no rash  Neuro: no headaches, no numbness/tingling      OBJECTIVE:     Exam:  /61 (BP Location: Right arm, Patient Position: Sitting)   Pulse 94   Temp 36.9 °C (98.4 °F)   Ht 1.753 m (5' 9\")   Wt 74.8 kg (165 lb)   SpO2 95%   BMI 24.37 kg/m²  Body mass index is 24.37 kg/m².    Gen: Alert and oriented, No apparent distress.  Head:  NCAT, EOMI, sclera clear without discharge  Neck: Neck is supple without lymphadenopathy.  Lungs: Normal effort, CTA bilaterally, no wheezes, rhonchi, or rales  CV: Regular rate and rhythm. No murmurs, rubs, or gallops.  Abd:   Non-distended, soft  Ext: No clubbing, cyanosis, edema.  MSK: Unassisted gait  Derm: No lesions on exposed skin  Psych: normal mood and affect      Labs:  Results for orders placed or performed in visit on 12/27/24   POCT Urinalysis    Collection Time: 12/27/24 " 10:34 AM   Result Value Ref Range    POC Color yellow Negative    POC Appearance cloudy Negative    POC Glucose 500 Negative mg/dL    POC Bilirubin neg Negative mg/dL    POC Ketones neg Negative mg/dL    POC Specific Gravity 1.025 <1.005 - >1.030    POC Blood moderate Negative    POC Urine PH 5.5 5.0 - 8.0    POC Protein 100 Negative mg/dL    POC Urobiligen 0.2 Negative (0.2) mg/dL    POC Nitrites neg Negative    POC Leukocyte Esterase small Negative       ASSESSMENT & PLAN:     79 y.o. male with the following -    Problem List Items Addressed This Visit       Dysuria      POCT UA showed cloudy urine with glucose (500), moderate blood and small leukocyte esterase. Patient denies abdominal pain or discomfort. Vitals are stable.   - Will order urine culture.  - Will order 7 days course of nitrofurantoin.   - Encourage adequate hydration.  - Encourage continued management of T2DM and healthy diet.  - RTC precautions discussed.     Relevant Medications    nitrofurantoin (MACROBID) 100 MG Cap    Other Relevant Orders    POCT Urinalysis (Completed)    URINE CULTURE       All plans of care were fully discussed with the patient and shared-decision making was utilized to conclude best course of actions in patient's medical management.       Rafael Funes, PGY-3  [facility]

## 2024-12-30 LAB
BACTERIA UR CULT: ABNORMAL
BACTERIA UR CULT: ABNORMAL
SIGNIFICANT IND 70042: ABNORMAL
SITE SITE: ABNORMAL
SOURCE SOURCE: ABNORMAL

## 2025-03-03 ENCOUNTER — APPOINTMENT (OUTPATIENT)
Dept: MEDICAL GROUP | Facility: CLINIC | Age: 80
End: 2025-03-03
Payer: MEDICARE

## 2025-03-03 VITALS
HEART RATE: 66 BPM | TEMPERATURE: 97 F | RESPIRATION RATE: 16 BRPM | WEIGHT: 166 LBS | OXYGEN SATURATION: 97 % | HEIGHT: 69 IN | SYSTOLIC BLOOD PRESSURE: 119 MMHG | BODY MASS INDEX: 24.59 KG/M2 | DIASTOLIC BLOOD PRESSURE: 75 MMHG

## 2025-03-03 DIAGNOSIS — E11.9 TYPE 2 DIABETES MELLITUS WITHOUT COMPLICATION, WITHOUT LONG-TERM CURRENT USE OF INSULIN (HCC): ICD-10-CM

## 2025-03-03 DIAGNOSIS — I10 HYPERTENSION, UNSPECIFIED TYPE: ICD-10-CM

## 2025-03-03 LAB
HBA1C MFR BLD: 7 % (ref ?–5.8)
POCT INT CON NEG: NEGATIVE
POCT INT CON POS: POSITIVE

## 2025-03-03 PROCEDURE — 3074F SYST BP LT 130 MM HG: CPT | Mod: GC

## 2025-03-03 PROCEDURE — 3078F DIAST BP <80 MM HG: CPT | Mod: GC

## 2025-03-03 PROCEDURE — 99213 OFFICE O/P EST LOW 20 MIN: CPT | Mod: GE

## 2025-03-03 PROCEDURE — 83036 HEMOGLOBIN GLYCOSYLATED A1C: CPT | Mod: GC

## 2025-03-03 RX ORDER — DIGOXIN 250 MCG
1 TABLET ORAL
COMMUNITY
End: 2025-03-03

## 2025-03-03 RX ORDER — NITROFURANTOIN 25; 75 MG/1; MG/1
CAPSULE ORAL
COMMUNITY
End: 2025-03-26

## 2025-03-03 RX ORDER — DIGOXIN 250 MCG
250 TABLET ORAL DAILY
COMMUNITY
Start: 2024-12-10 | End: 2025-03-03

## 2025-03-03 RX ORDER — WARFARIN SODIUM 5 MG/1
5 TABLET ORAL DAILY
COMMUNITY
Start: 2024-12-10 | End: 2025-06-08

## 2025-03-03 RX ORDER — WARFARIN SODIUM 5 MG/1
TABLET ORAL
COMMUNITY

## 2025-03-03 RX ORDER — ATORVASTATIN CALCIUM 40 MG/1
40 TABLET, FILM COATED ORAL DAILY
COMMUNITY
Start: 2024-12-10 | End: 2025-03-03

## 2025-03-03 RX ORDER — TAMSULOSIN HYDROCHLORIDE 0.4 MG/1
CAPSULE ORAL
COMMUNITY
End: 2025-03-03

## 2025-03-03 RX ORDER — LINAGLIPTIN 5 MG/1
1 TABLET, FILM COATED ORAL
COMMUNITY
End: 2025-03-03

## 2025-03-03 RX ORDER — LISINOPRIL 10 MG/1
10 TABLET ORAL DAILY
COMMUNITY
Start: 2024-12-10 | End: 2025-03-03

## 2025-03-03 RX ORDER — LISINOPRIL 10 MG/1
1 TABLET ORAL
COMMUNITY
End: 2025-03-03

## 2025-03-03 RX ORDER — METOPROLOL TARTRATE 50 MG
75 TABLET ORAL
COMMUNITY
Start: 2025-01-28 | End: 2026-07-22

## 2025-03-03 RX ORDER — ONDANSETRON 4 MG/1
TABLET, FILM COATED ORAL
COMMUNITY

## 2025-03-03 RX ORDER — ATORVASTATIN CALCIUM 40 MG/1
1 TABLET, FILM COATED ORAL EVERY EVENING
COMMUNITY
End: 2025-03-03

## 2025-03-03 ASSESSMENT — FIBROSIS 4 INDEX: FIB4 SCORE: 2.39

## 2025-03-03 NOTE — PROGRESS NOTES
Subjective:     CC:   Chief Complaint   Patient presents with    Follow-Up     Follow up        HPI:   Salvatore is a 79 y.o. male presents today with:    -Discuss blood pressure and type 2 diabetes.  He has completed his outside labs which demonstrated an elevated microalbumin to creatinine ratio at 82, lipid panel was also completed which was overall reassuring with a normal cholesterol, his HDL was a little low.  He reports that he feels well.  He has been able to exercise though he is stopped doing sit ups because it was giving him side pain.  He is doing lots of push-ups at home.  He shops for himself 3 times a week and says that he walks for at least 30 minutes each time he goes shopping.  We discussed increasing his exercise to at least 150 minutes/week.    Denies headache, chest pain, shortness of breath, swelling, N/V/D, tingling/numbness, lightheadedness/dizziness.    No problems updated.    Current Outpatient Medications Ordered in Epic   Medication Sig Dispense Refill    ondansetron (ZOFRAN) 4 MG Tab tablet ondansetron HCl 4 mg tablet      metoprolol tartrate (LOPRESSOR) 50 MG Tab Take 75 mg by mouth.      tamsulosin (FLOMAX) 0.4 MG capsule TAKE 1 CAPSULE(0.4 MG) BY MOUTH DAILY 30 Capsule 2    atorvastatin (LIPITOR) 40 MG Tab Take 1 Tablet by mouth every evening. 90 Tablet 3    linagliptin (TRADJENTA) 5 MG Tab tablet Take 1 Tablet by mouth every day. 90 Tablet 4    digoxin (LANOXIN) 250 MCG Tab 1 po daily      warfarin (COUMADIN) 2.5 MG Tab Take 2.5 mg by mouth every day.      lisinopril (PRINIVIL) 5 MG Tab Take 5 mg by mouth every bedtime.      nitrofurantoin (MACROBID) 100 MG Cap TAKE 1 CAPSULE BY MOUTH EVERY 12 HOURS FOR 7 DAYS (Patient not taking: Reported on 3/3/2025)      warfarin (COUMADIN) 5 MG Tab Take 5 mg by mouth every day. (Patient not taking: Reported on 3/3/2025)      warfarin (COUMADIN) 5 MG Tab TAKE ONE TABLET BY MOUTH EVERY DAY AT 5 PM (Patient not taking: Reported on 3/3/2025)       "tamsulosin (FLOMAX) 0.4 MG capsule Take 1 Capsule by mouth at bedtime. 100 Capsule 3    metoprolol (LOPRESSOR) 25 MG TABS Take 1 Tab by mouth 2 Times a Day. 60 Tab 0     No current Epic-ordered facility-administered medications on file.       ROS:  Negative except for above in HPI    Objective:     Exam:  /75 (BP Location: Left arm, Patient Position: Sitting, BP Cuff Size: Adult)   Pulse 66   Temp 36.1 °C (97 °F) (Temporal)   Resp 16   Ht 1.753 m (5' 9\")   Wt 75.3 kg (166 lb)   SpO2 97%   BMI 24.51 kg/m²  Body mass index is 24.51 kg/m².    Gen: Alert and oriented, No apparent distress.  HEENT: NCAT, MMM, No lymphadenopathy  Lungs: Normal effort, CTA bilaterally, no wheezes, rhonchi, or rales  CV: Regular rate and rhythm. No murmurs, rubs, or gallops. Radial pulses palpable bilat  Abd: Soft, non-distended, no guarding, no rebound, non-tender to palpation  Ext: No clubbing, cyanosis, edema.  Neuro: Non-focal    Monofilament testing with a 10 gram force: sensation intact: intact bilaterally  Visual Inspection: Feet without maceration, ulcers, fissures.  Pedal pulses: intact bilaterally      Labs:    Latest Reference Range & Units 03/03/25 09:33   Glycohemoglobin <=5.8 % 7.0 !   !: Data is abnormal    Assessment & Plan:     79 y.o. male with the following -   1. Hypertension, unspecified type  Well-controlled on 5 mg lisinopril daily.  Patient is not taking home blood pressure measurements.  Blood pressure today 119/75.  Will continue this dose for his renal protective properties.  Patient denies chest pain, shortness of breath, lightheadedness, dizziness.  He is exercising, reports good diet  -Continue lisinopril 5 mg daily    2. Type 2 diabetes mellitus without complication, without long-term current use of insulin ([facility])  A1c 7% today, up from 6.7% in October 2024.  Patient reports that he is adhering to relatively the same diet.  We discussed exercising which he might be getting close to 150 minutes/week " but he is agreeable to increasing this.  We discussed adding metformin to his Tradjenta 5 mg daily but patient would like to use conservative techniques at this time and will work on increasing exercise and improving diet.  Will plan to follow-up in 4 to 6 months and update A1c at that time.  Outpatient labs performed with microalbumin creatinine ratio was elevated at 82.  Monofilament testing within normal limits today  -Continue Tradjenta 5 mg  -Discussed diet and exercise, patient will work on these conservative measures before considering adding a second agent like metformin  -Follow-up in 4 to 6 months  - POCT  A1C  - Diabetic Monofilament Lower Extremity Exam    Problem List Items Addressed This Visit       Hypertension    Relevant Medications    metoprolol tartrate (LOPRESSOR) 50 MG Tab    warfarin (COUMADIN) 5 MG Tab    warfarin (COUMADIN) 5 MG Tab    DM (diabetes mellitus) (HCC)    Relevant Orders    POCT  A1C (Completed)    Diabetic Monofilament Lower Extremity Exam (Completed)       No follow-ups on file.      Elmer Merida MD  Resident - PGY2  [facility]

## 2025-03-26 ENCOUNTER — OFFICE VISIT (OUTPATIENT)
Dept: MEDICAL GROUP | Facility: CLINIC | Age: 80
End: 2025-03-26
Payer: MEDICARE

## 2025-03-26 ENCOUNTER — HOSPITAL ENCOUNTER (OUTPATIENT)
Facility: MEDICAL CENTER | Age: 80
End: 2025-03-26
Payer: MEDICARE

## 2025-03-26 VITALS
TEMPERATURE: 97 F | WEIGHT: 166 LBS | DIASTOLIC BLOOD PRESSURE: 62 MMHG | RESPIRATION RATE: 16 BRPM | OXYGEN SATURATION: 99 % | BODY MASS INDEX: 24.59 KG/M2 | SYSTOLIC BLOOD PRESSURE: 108 MMHG | HEIGHT: 69 IN | HEART RATE: 88 BPM

## 2025-03-26 DIAGNOSIS — R82.90 CLOUDY URINE: ICD-10-CM

## 2025-03-26 DIAGNOSIS — R30.0 DYSURIA: ICD-10-CM

## 2025-03-26 LAB
APPEARANCE UR: ABNORMAL
APPEARANCE UR: NORMAL
BACTERIA #/AREA URNS HPF: ABNORMAL /HPF
BILIRUB UR QL STRIP.AUTO: NEGATIVE
BILIRUB UR STRIP-MCNC: NEGATIVE MG/DL
CASTS URNS QL MICRO: ABNORMAL /LPF (ref 0–2)
COLOR UR AUTO: YELLOW
COLOR UR: YELLOW
EPITHELIAL CELLS 1715: ABNORMAL /HPF (ref 0–5)
GLUCOSE UR STRIP.AUTO-MCNC: 250 MG/DL
GLUCOSE UR STRIP.AUTO-MCNC: 500 MG/DL
KETONES UR STRIP.AUTO-MCNC: NEGATIVE MG/DL
KETONES UR STRIP.AUTO-MCNC: NEGATIVE MG/DL
LEUKOCYTE ESTERASE UR QL STRIP.AUTO: ABNORMAL
LEUKOCYTE ESTERASE UR QL STRIP.AUTO: NORMAL
MICRO URNS: ABNORMAL
NITRITE UR QL STRIP.AUTO: NEGATIVE
NITRITE UR QL STRIP.AUTO: NEGATIVE
PH UR STRIP.AUTO: 5.5 [PH] (ref 5–8)
PH UR STRIP.AUTO: 5.5 [PH] (ref 5–8)
PROT UR QL STRIP: 100 MG/DL
PROT UR QL STRIP: 100 MG/DL
RBC # URNS HPF: ABNORMAL /HPF (ref 0–2)
RBC UR QL AUTO: ABNORMAL
RBC UR QL AUTO: NORMAL
SP GR UR STRIP.AUTO: 1.02
SP GR UR STRIP.AUTO: 1.02
UROBILINOGEN UR STRIP-MCNC: 0.2 MG/DL
UROBILINOGEN UR STRIP.AUTO-MCNC: 1 EU/DL
WBC #/AREA URNS HPF: >100 /HPF

## 2025-03-26 PROCEDURE — 87077 CULTURE AEROBIC IDENTIFY: CPT

## 2025-03-26 PROCEDURE — 87086 URINE CULTURE/COLONY COUNT: CPT

## 2025-03-26 PROCEDURE — 87186 SC STD MICRODIL/AGAR DIL: CPT

## 2025-03-26 PROCEDURE — 81001 URINALYSIS AUTO W/SCOPE: CPT

## 2025-03-26 ASSESSMENT — FIBROSIS 4 INDEX: FIB4 SCORE: 2.39

## 2025-03-26 ASSESSMENT — PATIENT HEALTH QUESTIONNAIRE - PHQ9: CLINICAL INTERPRETATION OF PHQ2 SCORE: 0

## 2025-03-26 NOTE — PROGRESS NOTES
"Family Medicine Clinic Note    Date: 3/26/2025    SUBJECTIVE    Chief Complaint   Patient presents with    UTI     Cloudy urine X one week, denies any burning       Salvatore is a 79 y.o. person with history of  T2DM, HTN, hx UTI last in Dec 2024 presenting today with the following concerns:  Cloudy urine for the past week. He has no dysuria, no urgency, no frequency. He takes Tamsulosin and has followed with Urology in the past. Denies changes in urinary stream, no dribbling, and feels that he empties his bladder completely.   Denies fever, chills, suprapubic pain, testicular pain, back pain, nausea or vomiting.   He is in a monogamous relationship with his wife, no concerns for STI.     His last UTI presented with dysuria.     OBJECTIVE  /62 (BP Location: Left arm, Patient Position: Sitting, BP Cuff Size: Adult)   Pulse 88   Temp 36.1 °C (97 °F) (Temporal)   Resp 16   Ht 1.753 m (5' 9\")   Wt 75.3 kg (166 lb)   SpO2 99%   BMI 24.51 kg/m²      GENERAL: This is an alert adult in no distress.   HEENT:  Normocephalic, Atraumatic, moist mucus membranes, clear conjunctiva   HEART: Regular rate and rhythm without murmur.   LUNGS: Clear bilaterally to auscultation, no wheezes or rhonchi. No retractions, nasal flaring, or distress noted.  ABDOMEN: Normal bowel sounds, soft and non-tender without hepatomegaly or splenomegaly or masses. No CVA tenderness  SKIN: Skin is warm, dry, and pink.     Lab Results   Component Value Date/Time    POCCOLOR Yellow 03/26/2025 08:47 AM    POCAPPEAR Cloudy 03/26/2025 08:47 AM    POCLEUKEST Moderate 03/26/2025 08:47 AM    POCNITRITE Negative 03/26/2025 08:47 AM    POCUROBILIGE 0.2 03/26/2025 08:47 AM    POCPROTEIN 100 03/26/2025 08:47 AM    POCURPH 5.5 03/26/2025 08:47 AM    POCBLOOD Moderate 03/26/2025 08:47 AM    POCSPGRV 1.02 03/26/2025 08:47 AM    POCKETONES Negative 03/26/2025 08:47 AM    POCBILIRUBIN Negative 03/26/2025 08:47 AM    POCGLUCUA 250 03/26/2025 08:47 AM    "         ASSESSMENT & PLAN    1. Cloudy urine  2. Dysuria  POC UA as above. Without dysuria, not currently indicated for empiric treatment. However cannot rule out urinary tract infection from possible chronic urinary retention or possible malignancy due to presence of hematuria. Urology records are not on file, therefore unknown if has significant postvoid residual. Will need records and results of culture to determine    - Request records release from Urology  - URINALYSIS; Future  - URINE CULTURE(NEW); Future  - POCT Urinalysis      Discussed that if patient does not receive results within 1 week to contact clinic  Return if symptoms worsen or fail to improve.    Monie Jimenez, DO  PGY-1 Family Medicine

## 2025-03-26 NOTE — LETTER
[employer]  Monie Jimenez D.O.  [address]  [place] [address]  Fax: [phone]   Authorization for Release/Disclosure of   Protected Health Information   Name: TIARA BEST : 1945 SSN: [id number]   Address: [address]  [place] [address] Phone:    [phone] (home)    I authorize the entity listed below to release/disclose the PHI below to:   [employer]/Elmer Merida M.D. and Monie Jimenez D.O.   Provider or Entity Name:  [facility] [place]    Address   City, [facility], Zip  [address] [place] [address] Phone:  [phone]    Fax:  [phone]   Reason for request: continuity of care   Information to be released:    [  ] LAST COLONOSCOPY,  including any PATH REPORT and follow-up  [  ] LAST FIT/COLOGUARD RESULT [  ] LAST DEXA  [  ] LAST MAMMOGRAM  [  ] LAST PAP  [  ] LAST LABS [  ] RETINA EXAM REPORT  [  ] IMMUNIZATION RECORDS  [ X ] Release all info      [  ] Check here and initial the line next to each item to release ALL health information INCLUDING  _____ Care and treatment for drug and / or alcohol abuse  _____ HIV testing, infection status, or AIDS  _____ Genetic Testing    DATES OF SERVICE OR TIME PERIOD TO BE DISCLOSED: _____________  I understand and acknowledge that:  * This Authorization may be revoked at any time by you in writing, except if your health information has already been used or disclosed.  * Your health information that will be used or disclosed as a result of you signing this authorization could be re-disclosed by the recipient. If this occurs, your re-disclosed health information may no longer be protected by State or Federal laws.  * You may refuse to sign this Authorization. Your refusal will not affect your ability to obtain treatment.  * This Authorization becomes effective upon signing and will  on (date) __________.      If no date is indicated, this Authorization will  one (1) year from the signature date.    Name: Tiara  Omid Bowen  Signature: Date:   3/26/2025     PLEASE FAX REQUESTED RECORDS BACK TO: [phone]

## 2025-03-29 ENCOUNTER — RESULTS FOLLOW-UP (OUTPATIENT)
Dept: HOSPITALIST | Facility: MEDICAL CENTER | Age: 80
End: 2025-03-29

## 2025-03-29 DIAGNOSIS — R31.9 URINARY TRACT INFECTION WITH HEMATURIA, SITE UNSPECIFIED: ICD-10-CM

## 2025-03-29 DIAGNOSIS — N39.0 URINARY TRACT INFECTION WITH HEMATURIA, SITE UNSPECIFIED: ICD-10-CM

## 2025-03-29 RX ORDER — NITROFURANTOIN 25; 75 MG/1; MG/1
100 CAPSULE ORAL 2 TIMES DAILY
Qty: 14 CAPSULE | Refills: 0 | Status: SHIPPED | OUTPATIENT
Start: 2025-03-29 | End: 2025-04-05

## 2025-05-08 ENCOUNTER — APPOINTMENT (OUTPATIENT)
Dept: MEDICAL GROUP | Facility: CLINIC | Age: 80
End: 2025-05-08
Payer: MEDICARE

## 2025-05-08 VITALS
WEIGHT: 165.7 LBS | HEIGHT: 69 IN | DIASTOLIC BLOOD PRESSURE: 79 MMHG | OXYGEN SATURATION: 95 % | SYSTOLIC BLOOD PRESSURE: 120 MMHG | TEMPERATURE: 96.9 F | HEART RATE: 72 BPM | RESPIRATION RATE: 16 BRPM | BODY MASS INDEX: 24.54 KG/M2

## 2025-05-08 DIAGNOSIS — E11.9 TYPE 2 DIABETES MELLITUS WITHOUT COMPLICATION, WITHOUT LONG-TERM CURRENT USE OF INSULIN (HCC): ICD-10-CM

## 2025-05-08 PROCEDURE — 3078F DIAST BP <80 MM HG: CPT | Mod: GE

## 2025-05-08 PROCEDURE — 3074F SYST BP LT 130 MM HG: CPT | Mod: GE

## 2025-05-08 PROCEDURE — G0439 PPPS, SUBSEQ VISIT: HCPCS | Mod: GE

## 2025-05-08 ASSESSMENT — FIBROSIS 4 INDEX: FIB4 SCORE: 2.475

## 2025-05-08 ASSESSMENT — ACTIVITIES OF DAILY LIVING (ADL): BATHING_REQUIRES_ASSISTANCE: 0

## 2025-05-08 ASSESSMENT — PATIENT HEALTH QUESTIONNAIRE - PHQ9: CLINICAL INTERPRETATION OF PHQ2 SCORE: 0

## 2025-05-08 ASSESSMENT — ENCOUNTER SYMPTOMS: GENERAL WELL-BEING: EXCELLENT

## 2025-05-09 NOTE — PROGRESS NOTES
Chief Complaint   Patient presents with    Diabetes Follow-up       PLEASE NOTE: On the date of service, one or more separately identifiable problems unrelated to the wellness exam, each requiring independent evaluation and management, were addressed. Please see subjective and assessment/plan sections for further details about these concerns.     HPI:  Salvatore Bowen is a 79 y.o. 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      tamsulosin (FLOMAX) 0.4 MG capsule Take 1 Capsule by mouth at bedtime. 100 Capsule 3    atorvastatin (LIPITOR) 40 MG Tab Take 1 Tablet by mouth every evening. 90 Tablet 3    linagliptin (TRADJENTA) 5 MG Tab tablet Take 1 Tablet by mouth every day. 90 Tablet 4    digoxin (LANOXIN) 250 MCG Tab 1 po daily      warfarin (COUMADIN) 2.5 MG Tab Take 2.5 mg by mouth every day.      lisinopril (PRINIVIL) 5 MG Tab Take 5 mg by mouth every bedtime.      ondansetron (ZOFRAN) 4 MG Tab tablet ondansetron HCl 4 mg tablet (Patient not taking: Reported on 5/8/2025)      warfarin (COUMADIN) 5 MG Tab Take 5 mg by mouth every day. (Patient not taking: Reported on 5/8/2025)      warfarin (COUMADIN) 5 MG Tab TAKE ONE TABLET BY MOUTH EVERY DAY AT 5 PM (Patient  not taking: Reported on 5/8/2025)      tamsulosin (FLOMAX) 0.4 MG capsule TAKE 1 CAPSULE(0.4 MG) BY MOUTH DAILY (Patient not taking: Reported on 5/8/2025) 30 Capsule 2    metoprolol (LOPRESSOR) 25 MG TABS Take 1 Tab by mouth 2 Times a Day. (Patient not taking: Reported on 5/8/2025) 60 Tab 0     No current facility-administered medications for this visit.          Current supplements as per medication list.     Allergies: Patient has no known allergies.    Current social contact/activities: Walking, shopping, adult children    He  reports that he has never smoked. He has never used smokeless tobacco. He reports no current alcohol use. He reports no current drug use.  He was a former smoker of meth and user of cocaine quitting in his 60s      ROS:    Gait: Uses no assistive device  Ostomy: No  Other tubes: No  Amputations: No  Chronic oxygen use: No  Wears hearing aids: No   : Denies any urinary leakage during the last 6 months    Screening:  Genetic screening ordered  Depression Screening  Little interest or pleasure in doing things?  0 - not at all  Feeling down, depressed , or hopeless? 0 - not at all  Patient Health Questionnaire Score: 0     If depressive symptoms identified deferred to follow up visit unless specifically addressed in assessment and plan.    Interpretation of PHQ-9 Total Score   Score Severity   1-4 No Depression   5-9 Mild Depression   10-14 Moderate Depression   15-19 Moderately Severe Depression   20-27 Severe Depression    Screening for Cognitive Impairment  Do you or any of your friends or family members have any concern about your memory? No  Three Minute Recall (Village, Kitchen, Baby) 3/3    Farooq clock face with all 12 numbers and set the hands to show 10 minutes past 11.  Yes    Cognitive concerns identified deferred for follow up unless specifically addressed in assessment and plan.    Fall Risk Assessment  Has the patient had two or more falls in the last year or any fall with injury  in the last year?  No    Safety Assessment  Do you always wear your seatbelt?  Yes  Any changes to home needed to function safely? No  Difficulty hearing.  No  Patient counseled about all safety risks that were identified.    Functional Assessment ADLs  Are there any barriers preventing you from cooking for yourself or meeting nutritional needs?  No.    Are there any barriers preventing you from driving safely or obtaining transportation?  No.    Are there any barriers preventing you from using a telephone or calling for help?  No    Are there any barriers preventing you from shopping?  No.    Are there any barriers preventing you from taking care of your own finances?  No    Are there any barriers preventing you from managing your medications?  No    Are there any barriers preventing you from showering, bathing or dressing yourself? No    Are there any barriers preventing you from doing housework or laundry? No  Are there any barriers preventing you from using the toilet?No  Are you currently engaging in any exercise or physical activity?  No.      Self-Assessment of Health  What is your perception of your health? Excellent    Do you sleep more than six hours a night? Yes    In the past 7 days, how much did pain keep you from doing your normal work? None    Do you spend quality time with family or friends (virtually or in person)? Yes    Do you usually eat a heart healthy diet that constists of a variety of fruits, vegetables, whole grains and fiber? Yes    Do you eat foods high in fat and/or Fast Food more than three times per week? No    How concerned are you that your medical conditions are not being well managed? Not at all    Are you worried that in the next 2 months, you may not have stable housing that you own, rent, or stay in as part of a household? No      Advance Care Planning  Do you have an Advance Directive, Living Will, Durable Power of , or POLST? No                 Health Maintenance  Summary            Needs Review       Hepatitis C Screening  Tentatively Complete      02/18/2025  Outside Procedure: HEP C VIRUS ANTIBODY    10/03/2024  Reason not specified                      Upcoming       A1c Screening (Every 6 Months) Next due on 9/3/2025      03/03/2025  POCT  A1C    10/03/2024  HEMOGLOBIN A1C    04/10/2024  POCT  A1C    09/06/2023  POCT A1C    03/27/2023  POCT  A1C     Only the first 5 history entries have been loaded, but more history exists.            COVID-19 Vaccine (5 - 2024-25 season) Next due on 9/4/2025 03/04/2025  Outside Immunization: COVID-19(PFR) 12yrs \T\ up    11/28/2021  Imm Admin: MODERNA SARS-COV-2 VACCINE (12+)    04/09/2021  Imm Admin: MODERNA SARS-COV-2 VACCINE (12+)    03/11/2021  Imm Admin: MODERNA SARS-COV-2 VACCINE (12+)              Diabetes: Retinopathy Screening (Yearly) Next due on 1/7/2026 01/07/2025  Reason not specified    10/03/2022  Done - Pt sees optho              Diabetes: Monofilament / LE Exam (Yearly) Next due on 3/3/2026      03/03/2025  [employer]: WORKFLOW - DIABETES - DIABETIC FOOT EXAM PERFORMED    03/03/2025  Diabetic Monofilament Lower Extremity Exam    04/10/2024  Diabetic Monofilament Lower Extremity Exam    10/03/2022  Done              Diabetes: Urine Protein Screening (Yearly) Next due on 5/5/2026 05/05/2025  MICROALBUMIN CREAT RATIO URINE    02/18/2025  MICROALBUMIN CREAT RATIO URINE    04/24/2024  MICROALBUM. UR RANDOM    08/22/2023  MICROALBUM. UR RANDOM    02/22/2023  MICROALBUM. UR RANDOM      Only the first 5 history entries have been loaded, but more history exists.              SERUM CREATININE (Yearly) Next due on 5/5/2026 05/05/2025  RENAL FUNCTION PANEL    10/03/2024  COMP METABOLIC PANEL    06/12/2024  COMP METABOLIC PANEL    04/24/2024  RENAL FUNCTION PANEL    12/21/2023  COMP METABOLIC PANEL      Only the first 5 history entries have been loaded, but more history exists.              Annual Wellness Visit  (Yearly) Next due on 5/8/2026 05/08/2025  Done    03/27/2023  Level of Service: WI PREVENTIVE VISIT,EST,65 & OVER    10/03/2022  Level of Service: WI ANNUAL WELLNESS VISIT-INCLUDES PPPS SUBSEQUE*    10/03/2022  Done              IMM DTaP/Tdap/Td Vaccine (2 - Td or Tdap) Next due on 7/8/2034 07/08/2024  Imm Admin: Tdap Vaccine                      Completed or No Longer Recommended       Influenza Vaccine (Series Information) Completed      10/17/2024  Imm Admin: Influenza high-dose trivalent (PF)    01/30/2024  Imm Admin: Influenza Vaccine Adult HD    11/01/2022  Imm Admin: Influenza Seasonal Injectable - Historical Data    10/03/2022  Imm Admin: Influenza Vaccine Adult HD    10/21/2019  Imm Admin: Influenza Vaccine Quad Inj (Pf)      Only the first 5 history entries have been loaded, but more history exists.              Zoster (Shingles) Vaccines (Series Information) Completed      05/03/2025  Outside Immunization: Zoster Chetan (Shingrix)    03/04/2025  Outside Immunization: Zoster Chetan (Shingrix)    10/27/2017  Imm Admin: Zoster Vaccine Live (ZVL) (Zostavax) - HISTORICAL DATA              Pneumococcal Vaccine: 50+ Years (Series Information) Completed      10/27/2017  Imm Admin: Pneumococcal Conjugate Vaccine (Prevnar/PCV-13)    01/13/2017  Imm Admin: Pneumococcal Conjugate Vaccine (Prevnar/PCV-13)    05/23/2013  Imm Admin: Pneumococcal polysaccharide vaccine (PPSV-23)    05/07/2008  Imm Admin: Pneumococcal Conjugate Vaccine (Prevnar/PCV-13)              Hepatitis A Vaccine (Hep A) (Series Information) Aged Out      No completion history exists for this topic.              Hepatitis B Vaccine (Hep B) (Series Information) Aged Out     No completion history exists for this topic.              HPV Vaccines (Series Information) Aged Out     No completion history exists for this topic.              Polio Vaccine (Inactivated Polio) (Series Information) Aged Out     No completion history exists for this topic.  "             Meningococcal Immunization (Series Information) Aged Out     No completion history exists for this topic.              Fasting Lipid Profile  Discontinued      02/18/2025  Outside Procedure: LIPID PROFILE    10/03/2024  Outside Procedure: LIPID PROFILE    06/12/2024  Outside Procedure: LIPID PROFILE    12/21/2023  Outside Procedure: LIPID PROFILE    03/27/2022  Done     Only the first 5 history entries have been loaded, but more history exists.                          Patient Care Team:  Elmer Merida M.D. as PCP - General (Family Medicine)        Social History     Tobacco Use    Smoking status: Never    Smokeless tobacco: Never   Vaping Use    Vaping status: Never Used   Substance Use Topics    Alcohol use: Yes     Comment: occ    Drug use: Yes     Types: Inhaled     Comment: methamphetamin (last used 4 yrs ago)     History reviewed. No pertinent family history.  He  has a past medical history of Gastric ulcer (2012), Hypertension (08/03/2020), Seizure disorder (HCC) (1983), and Stroke (HCC) (1983).   Past Surgical History:   Procedure Laterality Date    LAPAROSCOPIC INGUINAL HERNIA REPAIR Right 8/6/2020    Procedure: REPAIR, HERNIA, INGUINAL, LAPAROSCOPIC;  Surgeon: Brad Camarillo M.D.;  Location: SURGERY [facility];  Service: Vascular    OTHER ABDOMINAL SURGERY  2019    hernia    OTHER NEUROLOGICAL SURG  1983    \"Put a plate in my skull r/t stroke\"       Exam:   /79 (BP Location: Right arm, Patient Position: Sitting, BP Cuff Size: Adult)   Pulse 72   Temp 36.1 °C (96.9 °F) (Temporal)   Resp 16   Ht 1.753 m (5' 9\")   Wt 75.2 kg (165 lb 11.2 oz)   SpO2 95%  Body mass index is 24.47 kg/m².    Hearing good.    Dentition adentulous  Alert, oriented in no acute distress.  Eye contact is good, speech goal directed, affect calm    Assessment and Plan. The following treatment and monitoring plan is recommended:    1. Type 2 diabetes mellitus without complication, without long-term " current use of insulin (HCC)    2. [facility] Billing  - Referral to Genetic Research Studies    #Type 2 diabetes  Patient with type 2 diabetes, on Tradjenta monotherapy, declined metformin in the past.  His A1c was 7% in March 2025, up from 6.7% in October 2024.  He reports improvement in his exercise is feeling good about his chances of having an A1c under 7% again.  His diet is also much improved cutting out a lot of the sweets since his diagnosis of type 2 diabetes.  Counseling on diet and exercise provided, will continue Tradjenta 5 mg and plan for a follow-up in roughly 2 months to check his A1c again    Services suggested: No services needed at this time  Health Care Screening: Age-appropriate preventive services recommended by USPTF and ACIP covered by Medicare were discussed today. Services ordered if indicated and agreed upon by the patient.  Referrals offered: Community-based lifestyle interventions to reduce health risks and promote self-management and wellness, fall prevention, nutrition, physical activity, tobacco-use cessation, weight loss, and mental health services as per orders if indicated.    Discussion today about general wellness and lifestyle habits:    Prevent falls and reduce trip hazards; Cautioned about securing or removing rugs.  Have a working fire alarm and carbon monoxide detector;   Engage in regular physical activity and social activities     Follow-up: No follow-ups on file.

## 2025-05-09 NOTE — PATIENT INSTRUCTIONS
Fall Prevention in the Home, Adult  Falls can cause injuries and can happen to people of all ages. There are many things you can do to make your home safe and to help prevent falls. Ask for help when making these changes.  What actions can I take to prevent falls?  General Instructions  Use good lighting in all rooms. Replace any light bulbs that burn out.  Turn on the lights in dark areas. Use night-lights.  Keep items that you use often in easy-to-reach places. Lower the shelves around your home if needed.  Set up your furniture so you have a clear path. Avoid moving your furniture around.  Do not have throw rugs or other things on the floor that can make you trip.  Avoid walking on wet floors.  If any of your floors are uneven, fix them.  Add color or contrast paint or tape to clearly sara and help you see:  Grab bars or handrails.  First and last steps of staircases.  Where the edge of each step is.  If you use a stepladder:  Make sure that it is fully opened. Do not climb a closed stepladder.  Make sure the sides of the stepladder are locked in place.  Ask someone to hold the stepladder while you use it.  Know where your pets are when moving through your home.  What can I do in the bathroom?         Keep the floor dry. Clean up any water on the floor right away.  Remove soap buildup in the tub or shower.  Use nonskid mats or decals on the floor of the tub or shower.  Attach bath mats securely with double-sided, nonslip rug tape.  If you need to sit down in the shower, use a plastic, nonslip stool.  Install grab bars by the toilet and in the tub and shower. Do not use towel bars as grab bars.  What can I do in the bedroom?  Make sure that you have a light by your bed that is easy to reach.  Do not use any sheets or blankets for your bed that hang to the floor.  Have a firm chair with side arms that you can use for support when you get dressed.  What can I do in the kitchen?  Clean up any spills right away.  If  you need to reach something above you, use a step stool with a grab bar.  Keep electrical cords out of the way.  Do not use floor polish or wax that makes floors slippery.  What can I do with my stairs?  Do not leave any items on the stairs.  Make sure that you have a light switch at the top and the bottom of the stairs.  Make sure that there are handrails on both sides of the stairs. Fix handrails that are broken or loose.  Install nonslip stair treads on all your stairs.  Avoid having throw rugs at the top or bottom of the stairs.  Choose a carpet that does not hide the edge of the steps on the stairs.  Check carpeting to make sure that it is firmly attached to the stairs. Fix carpet that is loose or worn.  What can I do on the outside of my home?  Use bright outdoor lighting.  Fix the edges of walkways and driveways and fix any cracks.  Remove anything that might make you trip as you walk through a door, such as a raised step or threshold.  Trim any bushes or trees on paths to your home.  Check to see if handrails are loose or broken and that both sides of all steps have handrails.  Install guardrails along the edges of any raised decks and porches.  Clear paths of anything that can make you trip, such as tools or rocks.  Have leaves, snow, or ice cleared regularly.  Use sand or salt on paths during winter.  Clean up any spills in your garage right away. This includes grease or oil spills.  What other actions can I take?  Wear shoes that:  Have a low heel. Do not wear high heels.  Have rubber bottoms.  Feel good on your feet and fit well.  Are closed at the toe. Do not wear open-toe sandals.  Use tools that help you move around if needed. These include:  Canes.  Walkers.  Scooters.  Crutches.  Review your medicines with your doctor. Some medicines can make you feel dizzy. This can increase your chance of falling.  Ask your doctor what else you can do to help prevent falls.  Where to find more information  Centers  for Disease Control and Prevention, [place]: www.cdc.gov  [employer] [place] on Aging: www.[place]og[place].nih.gov  Contact a doctor if:  You are afraid of falling at home.  You feel weak, drowsy, or dizzy at home.  You fall at home.  Summary  There are many simple things that you can do to make your home safe and to help prevent falls.  Ways to make your home safe include removing things that can make you trip and installing grab bars in the bathroom.  Ask for help when making these changes in your home.  This information is not intended to replace advice given to you by your health care provider. Make sure you discuss any questions you have with your health care provider.  Document Revised: 09/19/2022 Document Reviewed: 07/21/2021  Elsevier Patient Education © 2023 [employer]

## 2025-06-09 NOTE — TELEPHONE ENCOUNTER
Received request via: Pharmacy    Was the patient seen in the last year in this department? Yes    Does the patient have an active prescription (recently filled or refills available) for medication(s) requested? No    Pharmacy Name:   [id number] - [place], [place] - [address]  [address] [address]  Phone: [phone] Fax: [phone]        Does the patient have [facility] Plus and need 100-day supply? (This applies to ALL medications) Patient does not have SCP

## 2025-06-10 RX ORDER — LINAGLIPTIN 5 MG/1
5 TABLET, FILM COATED ORAL DAILY
Qty: 90 TABLET | Refills: 4 | Status: SHIPPED | OUTPATIENT
Start: 2025-06-10 | End: 2025-06-18 | Stop reason: SDUPTHER

## 2025-06-18 NOTE — TELEPHONE ENCOUNTER
Received request via: Pharmacy    Was the patient seen in the last year in this department? Yes    Does the patient have an active prescription (recently filled or refills available) for medication(s) requested? No    Pharmacy Name: [facility] #[id number] - [place], [place] - [address] [[id number]]     Does the patient have alf Plus and need 100-day supply? (This applies to ALL medications) Patient does not have SCP

## 2025-06-19 RX ORDER — LINAGLIPTIN 5 MG/1
5 TABLET, FILM COATED ORAL DAILY
Qty: 90 TABLET | Refills: 4 | Status: SHIPPED | OUTPATIENT
Start: 2025-06-19

## 2025-07-01 ENCOUNTER — APPOINTMENT (OUTPATIENT)
Dept: MEDICAL GROUP | Facility: CLINIC | Age: 80
End: 2025-07-01
Payer: MEDICARE

## 2025-07-01 VITALS
TEMPERATURE: 97 F | RESPIRATION RATE: 16 BRPM | BODY MASS INDEX: 24.11 KG/M2 | DIASTOLIC BLOOD PRESSURE: 79 MMHG | SYSTOLIC BLOOD PRESSURE: 136 MMHG | OXYGEN SATURATION: 94 % | HEIGHT: 69 IN | WEIGHT: 162.8 LBS | HEART RATE: 82 BPM

## 2025-07-01 DIAGNOSIS — I10 HYPERTENSION, UNSPECIFIED TYPE: ICD-10-CM

## 2025-07-01 DIAGNOSIS — E11.9 TYPE 2 DIABETES MELLITUS WITHOUT COMPLICATION, WITHOUT LONG-TERM CURRENT USE OF INSULIN (HCC): Primary | ICD-10-CM

## 2025-07-01 LAB
HBA1C MFR BLD: 7.4 % (ref ?–5.8)
POCT INT CON NEG: NEGATIVE
POCT INT CON POS: POSITIVE

## 2025-07-01 PROCEDURE — 99214 OFFICE O/P EST MOD 30 MIN: CPT | Mod: GC

## 2025-07-01 PROCEDURE — 3078F DIAST BP <80 MM HG: CPT | Mod: GC

## 2025-07-01 PROCEDURE — 83036 HEMOGLOBIN GLYCOSYLATED A1C: CPT | Mod: GC

## 2025-07-01 PROCEDURE — 3075F SYST BP GE 130 - 139MM HG: CPT | Mod: GC

## 2025-07-01 RX ORDER — METFORMIN HYDROCHLORIDE 500 MG/1
500 TABLET, EXTENDED RELEASE ORAL DAILY
Qty: 100 TABLET | Refills: 3 | Status: SHIPPED | OUTPATIENT
Start: 2025-07-01 | End: 2026-08-05

## 2025-07-01 RX ORDER — METFORMIN HYDROCHLORIDE 500 MG/1
500 TABLET, EXTENDED RELEASE ORAL DAILY
Qty: 100 TABLET | Refills: 3 | Status: SHIPPED | OUTPATIENT
Start: 2025-07-01 | End: 2025-07-01

## 2025-07-01 ASSESSMENT — FIBROSIS 4 INDEX: FIB4 SCORE: 2.51

## 2025-07-01 NOTE — PROGRESS NOTES
"Subjective:     CC:   Chief Complaint   Patient presents with    Diabetes       HPI:   Salvatore is a 80 y.o. male presents today with:    - Type 2 diabetes follow-up; last A1c was 7% roughly 3 months ago, today 7.4%.  He reports he has not been able to walk is much last month only walking 11 miles due to the heat and not feeling as motivated.  He feels the new A1c of 7.4% will motivate him to walk more.  Reports adherence to a good diet though does endorse increasing his ice cream intake during these hot months to roughly 2 points per month.  He otherwise is not drinking sugary drinks such as soda and otherwise having balanced meals with a protein, vegetable, and a carb.  He is tolerating his Tradjenta 5 mg which is monotherapy at this time.  He is agreeable to retrialing metformin as in the past is given him.  He denies urinary changes.    -Hypertension follow-up, on 5 mg of lisinopril a day.  Partly a renal protective dose but has had mildly elevated blood pressures in the past.  Today 136/79.  Review of systems negative for headache, vision changes, chest pain, shortness of breath.    No problems updated.    Current Medications and Prescriptions Ordered in Epic[1]    ROS:  Negative except for above in HPI    Objective:     Exam:  /79 (BP Location: Right arm, Patient Position: Sitting, BP Cuff Size: Adult)   Pulse 82   Temp 36.1 °C (97 °F) (Temporal)   Resp 16   Ht 1.753 m (5' 9\")   Wt 73.8 kg (162 lb 12.8 oz)   SpO2 94%   BMI 24.04 kg/m²  Body mass index is 24.04 kg/m².    Gen: Alert and oriented, No apparent distress.  HEENT: NCAT, MMM, No lymphadenopathy  Lungs: Normal effort, CTA bilaterally, no wheezes, rhonchi, or rales  CV: Regular rate, irregular rhythm. No murmurs, rubs, or gallops. Radial pulses palpable bilat  Abd: Soft, non-distended, no guarding, no rebound, non-tender to palpation  Ext: No clubbing, cyanosis, edema.  Neuro: Non-focal    Labs:    Latest Reference Range & Units 04/10/24 " 08:31 10/03/24 07:58 03/03/25 09:33 07/01/25 10:25   Glycohemoglobin <=5.8 % 6.5 ! 6.7 (H) (E) 7.0 ! 7.4 !   !: Data is abnormal  (H): Data is abnormally high  (E): External lab result    Assessment & Plan:     80 y.o. male with the following -     Assessment & Plan  Type 2 diabetes mellitus without complication, without long-term current use of insulin ([facility])  A1c 7.4 today, up from March 2025 A1c of 7.  Tolerating Tradjenta 5 mg monotherapy.  Agreeable to retrialing metformin 500 mg sustained-release and increasing to 1000 mg if tolerated.  Instructed to take these with meals.  Discussed sustained-release and benefits on the GI side effects that he is previously experienced.  Continue low-dose lisinopril 5 mg, see hypertension below  -A1c 7.4%  -Continue Tradjenta 5 mg  -Start metformin  mg daily with meal, uptitrate to 1000 mg if tolerated  Orders:    POCT  A1C    Hypertension, unspecified type  Well-controlled, requiring low-dose lisinopril 5 mg which is partly for renal protective factors given type 2 diabetes.  May 2025 CHEM panel shows a creatinine of 1.5, baseline seems to be 1.3-1.5.  Exam reassuring, ROS reassuring.  Will we will continue 5 mg lisinopril as BP today was 136/79.  -Continue 5 mg lisinopril         Problem List Items Addressed This Visit       Hypertension    DM (diabetes mellitus) ([facility]) - Primary    Relevant Medications    metFORMIN ER (GLUCOPHAGE XR) 500 MG TABLET SR 24 HR    Other Relevant Orders    POCT  A1C (Completed)       No follow-ups on file.      Elmer Merida MD  Resident - PGY2  [facility]         [1]   Current Outpatient Medications Ordered in Epic   Medication Sig Dispense Refill    metFORMIN ER (GLUCOPHAGE XR) 500 MG TABLET SR 24 HR Take 1 Tablet by mouth every day. After two weeks if no stomach issues you can increase to 2 pills per day. Take with meals 100 Tablet 3    linagliptin (TRADJENTA) 5 MG Tab tablet Take 1 Tablet by mouth every  day. 90 Tablet 4    ondansetron (ZOFRAN) 4 MG Tab tablet ondansetron HCl 4 mg tablet      metoprolol tartrate (LOPRESSOR) 50 MG Tab Take 75 mg by mouth.      warfarin (COUMADIN) 5 MG Tab TAKE ONE TABLET BY MOUTH EVERY DAY AT 5 PM      tamsulosin (FLOMAX) 0.4 MG capsule TAKE 1 CAPSULE(0.4 MG) BY MOUTH DAILY 30 Capsule 2    tamsulosin (FLOMAX) 0.4 MG capsule Take 1 Capsule by mouth at bedtime. 100 Capsule 3    atorvastatin (LIPITOR) 40 MG Tab Take 1 Tablet by mouth every evening. 90 Tablet 3    digoxin (LANOXIN) 250 MCG Tab 1 po daily      warfarin (COUMADIN) 2.5 MG Tab Take 2.5 mg by mouth every day.      lisinopril (PRINIVIL) 5 MG Tab Take 5 mg by mouth every bedtime.      metoprolol (LOPRESSOR) 25 MG TABS Take 1 Tab by mouth 2 Times a Day. 60 Tab 0     No current Epic-ordered facility-administered medications on file.

## 2025-07-01 NOTE — ASSESSMENT & PLAN NOTE
Well-controlled, requiring low-dose lisinopril 5 mg which is partly for renal protective factors given type 2 diabetes.  May 2025 CHEM panel shows a creatinine of 1.5, baseline seems to be 1.3-1.5.  Exam reassuring, ROS reassuring.  Will we will continue 5 mg lisinopril as BP today was 136/79.  -Continue 5 mg lisinopril

## 2025-07-01 NOTE — ASSESSMENT & PLAN NOTE
A1c 7.4 today, up from March 2025 A1c of 7.  Tolerating Tradjenta 5 mg monotherapy.  Agreeable to retrialing metformin 500 mg sustained-release and increasing to 1000 mg if tolerated.  Instructed to take these with meals.  Discussed sustained-release and benefits on the GI side effects that he is previously experienced.  Continue low-dose lisinopril 5 mg, see hypertension below  -A1c 7.4%  -Continue Tradjenta 5 mg  -Start metformin  mg daily with meal, uptitrate to 1000 mg if tolerated  Orders:    POCT  A1C

## 2025-07-14 ENCOUNTER — HOSPITAL ENCOUNTER (OUTPATIENT)
Facility: MEDICAL CENTER | Age: 80
End: 2025-07-14
Payer: MEDICARE

## 2025-07-14 ENCOUNTER — OFFICE VISIT (OUTPATIENT)
Dept: MEDICAL GROUP | Facility: CLINIC | Age: 80
End: 2025-07-14
Payer: MEDICARE

## 2025-07-14 VITALS
TEMPERATURE: 97.1 F | HEART RATE: 90 BPM | HEIGHT: 69 IN | BODY MASS INDEX: 23.95 KG/M2 | SYSTOLIC BLOOD PRESSURE: 125 MMHG | OXYGEN SATURATION: 94 % | DIASTOLIC BLOOD PRESSURE: 79 MMHG | WEIGHT: 161.7 LBS | RESPIRATION RATE: 16 BRPM

## 2025-07-14 DIAGNOSIS — M25.551 RIGHT HIP PAIN: Primary | ICD-10-CM

## 2025-07-14 DIAGNOSIS — M25.551 RIGHT HIP PAIN: ICD-10-CM

## 2025-07-14 LAB
ALBUMIN SERPL BCP-MCNC: 4.2 G/DL (ref 3.2–4.9)
ALBUMIN/GLOB SERPL: 1.1 G/DL
ALP SERPL-CCNC: 86 U/L (ref 30–99)
ALT SERPL-CCNC: 30 U/L (ref 2–50)
ANION GAP SERPL CALC-SCNC: 12 MMOL/L (ref 7–16)
AST SERPL-CCNC: 22 U/L (ref 12–45)
BILIRUB SERPL-MCNC: 0.7 MG/DL (ref 0.1–1.5)
BUN SERPL-MCNC: 18 MG/DL (ref 8–22)
CALCIUM ALBUM COR SERPL-MCNC: 9.2 MG/DL (ref 8.5–10.5)
CALCIUM SERPL-MCNC: 9.4 MG/DL (ref 8.5–10.5)
CHLORIDE SERPL-SCNC: 105 MMOL/L (ref 96–112)
CO2 SERPL-SCNC: 23 MMOL/L (ref 20–33)
CREAT SERPL-MCNC: 1.24 MG/DL (ref 0.5–1.4)
GFR SERPLBLD CREATININE-BSD FMLA CKD-EPI: 59 ML/MIN/1.73 M 2
GLOBULIN SER CALC-MCNC: 3.7 G/DL (ref 1.9–3.5)
GLUCOSE SERPL-MCNC: 105 MG/DL (ref 65–99)
POTASSIUM SERPL-SCNC: 4.5 MMOL/L (ref 3.6–5.5)
PROT SERPL-MCNC: 7.9 G/DL (ref 6–8.2)
SODIUM SERPL-SCNC: 140 MMOL/L (ref 135–145)

## 2025-07-14 PROCEDURE — 36415 COLL VENOUS BLD VENIPUNCTURE: CPT

## 2025-07-14 PROCEDURE — 80053 COMPREHEN METABOLIC PANEL: CPT

## 2025-07-14 PROCEDURE — 99213 OFFICE O/P EST LOW 20 MIN: CPT | Mod: GE

## 2025-07-14 PROCEDURE — 3074F SYST BP LT 130 MM HG: CPT

## 2025-07-14 PROCEDURE — 3078F DIAST BP <80 MM HG: CPT

## 2025-07-14 ASSESSMENT — FIBROSIS 4 INDEX: FIB4 SCORE: 2.51

## 2025-07-14 NOTE — PROGRESS NOTES
"Family Medicine Clinic Note    Date: 7/14/2025    SUBJECTIVE    Chief Complaint   Patient presents with    Referral Needed     For pain in left hip/groin area [id number] Chase is a 80 y.o. person with history of  T2DM presenting today with concern for hip pain.     Pain onset 1 to 1.5 months ago described as gradual.  Pain is located in the right groin, greatest when flexing leg.  He denies any episodes of heavy lifting, pulling himself up or stepping into large stairs at pain onset.  Pain is near constant, greatest when moving from sitting to standing, some pain at rest and some pain with walking that is causing him to have a slight limp.  Reports minimal radiation of pain however does occur from hip to knee, no radiation of pain down the back of the leg.  No numbness or tingling.  No neuropathy, has examined his feet and no concerns for wounds in his feet.  Denies any changes of urination or stooling.  He has been taking Tylenol 1000 mg every 6 hours, and reports that it has been helping.  Ice has been of minimal benefit.    He was previously walking about a mile a day and now is walking has been limited to home and out shopping.  Today when he was shopping he needed to lean on the shopping cart because it was putting some of the weight on the cart.    He has a history of inguinal hernias bilaterally.  First occurring in the 1970s, last occurred a few years ago.  Not feeling any groin bulging pain or tenderness    OBJECTIVE  /79 (BP Location: Left arm, Patient Position: Sitting, BP Cuff Size: Adult)   Pulse 90   Temp 36.2 °C (97.1 °F) (Temporal)   Resp 16   Ht 1.753 m (5' 9\")   Wt 73.3 kg (161 lb 11.2 oz)   SpO2 94%   BMI 23.88 kg/m²      GENERAL: This is an alert, active adult in no distress.   HEENT:  Normocephalic, Atraumatic, moist mucus membranes, clear conjunctiva   HEART: Regular rate and rhythm without murmur.   LUNGS: Clear bilaterally to auscultation, no wheezes or rhonchi. No " retractions, nasal flaring, or distress noted.  ABDOMEN: Normal bowel sounds, soft and non-tender without hepatomegaly or splenomegaly or masses.   SKIN: Skin is warm, dry, and pink.   MSK: R hip ROM limited secondary to pain, pain with internal rotation of the R hip, flexion, mild to external rotation. No pain with extension of hip. No pain with full ROM of L hip, knees bilaterally.         ASSESSMENT & PLAN    1. Right hip pain  High degree of suspicion for osteoarthritis of the R hip with pain on flexion and internal rotation most notable. Has had good management of pain with tylenol, taking q6h. Is on warfarin, therefore cannot take NSAID.   - Comp Metabolic Panel; Future  - DX-HIP-BILATERAL-WITH PELVIS-2 VIEWS; Future  - Referral to Orthopedics  - Referral to Physical Therapy      Care Gaps addressed this visit: none      Discussed that if patient does not receive results within 1 week to contact clinic  Return in about 2 months (around 9/14/2025) for Hip pain.      Monie Jimenez, DO  PGY-2   [facility]

## 2025-07-16 ENCOUNTER — HOSPITAL ENCOUNTER (OUTPATIENT)
Dept: RADIOLOGY | Facility: MEDICAL CENTER | Age: 80
End: 2025-07-16
Attending: STUDENT IN AN ORGANIZED HEALTH CARE EDUCATION/TRAINING PROGRAM
Payer: MEDICARE

## 2025-07-16 ENCOUNTER — HOSPITAL ENCOUNTER (OUTPATIENT)
Dept: RADIOLOGY | Facility: MEDICAL CENTER | Age: 80
End: 2025-07-16
Payer: MEDICARE

## 2025-07-16 ENCOUNTER — OFFICE VISIT (OUTPATIENT)
Dept: MEDICAL GROUP | Facility: CLINIC | Age: 80
End: 2025-07-16
Payer: MEDICARE

## 2025-07-16 VITALS
SYSTOLIC BLOOD PRESSURE: 117 MMHG | OXYGEN SATURATION: 96 % | BODY MASS INDEX: 23.55 KG/M2 | HEART RATE: 83 BPM | TEMPERATURE: 97.4 F | HEIGHT: 69 IN | WEIGHT: 159 LBS | DIASTOLIC BLOOD PRESSURE: 75 MMHG

## 2025-07-16 DIAGNOSIS — R20.0 NUMBNESS OF RIGHT FOOT: ICD-10-CM

## 2025-07-16 DIAGNOSIS — M54.50 CHRONIC MIDLINE LOW BACK PAIN, UNSPECIFIED WHETHER SCIATICA PRESENT: ICD-10-CM

## 2025-07-16 DIAGNOSIS — M25.551 RIGHT HIP PAIN: ICD-10-CM

## 2025-07-16 DIAGNOSIS — Z85.828 HISTORY OF BASAL CELL CARCINOMA (BCC) OF SKIN: ICD-10-CM

## 2025-07-16 DIAGNOSIS — G89.29 CHRONIC MIDLINE LOW BACK PAIN, UNSPECIFIED WHETHER SCIATICA PRESENT: ICD-10-CM

## 2025-07-16 PROBLEM — L98.9 SKIN LESION OF RIGHT ARM: Status: RESOLVED | Noted: 2023-10-12 | Resolved: 2025-07-16

## 2025-07-16 PROBLEM — Z53.20 SCREENING FOR HEPATITIS C DECLINED: Status: RESOLVED | Noted: 2024-07-08 | Resolved: 2025-07-16

## 2025-07-16 PROBLEM — Z00.00 PREVENTATIVE HEALTH CARE: Status: RESOLVED | Noted: 2022-04-13 | Resolved: 2025-07-16

## 2025-07-16 PROBLEM — Z12.83 SKIN EXAM FOR MALIGNANT NEOPLASM: Status: RESOLVED | Noted: 2024-01-10 | Resolved: 2025-07-16

## 2025-07-16 PROBLEM — C44.622 SQUAMOUS CELL CANCER OF SKIN OF RIGHT SHOULDER: Status: RESOLVED | Noted: 2023-12-05 | Resolved: 2025-07-16

## 2025-07-16 PROBLEM — Z23 NEED FOR TDAP VACCINATION: Status: RESOLVED | Noted: 2024-07-08 | Resolved: 2025-07-16

## 2025-07-16 PROCEDURE — 3078F DIAST BP <80 MM HG: CPT | Performed by: STUDENT IN AN ORGANIZED HEALTH CARE EDUCATION/TRAINING PROGRAM

## 2025-07-16 PROCEDURE — 99213 OFFICE O/P EST LOW 20 MIN: CPT | Performed by: STUDENT IN AN ORGANIZED HEALTH CARE EDUCATION/TRAINING PROGRAM

## 2025-07-16 PROCEDURE — 73521 X-RAY EXAM HIPS BI 2 VIEWS: CPT

## 2025-07-16 PROCEDURE — 72110 X-RAY EXAM L-2 SPINE 4/>VWS: CPT

## 2025-07-16 PROCEDURE — 3074F SYST BP LT 130 MM HG: CPT | Performed by: STUDENT IN AN ORGANIZED HEALTH CARE EDUCATION/TRAINING PROGRAM

## 2025-07-16 ASSESSMENT — FIBROSIS 4 INDEX: FIB4 SCORE: 2.24

## 2025-07-16 NOTE — PROGRESS NOTES
"[facility] OFFICE VISIT    Date: 7/16/2025    MRN: [id number]  Patient ID: Salvatore Bowen    SUBJECTIVE:  Salvatore Bowen is a 80 y.o. male here for evaluation of right foot numbness.  Patient reports that this began approximately 1 month ago, with no inciting factors prior to onset.  Has noted that he still has full sensation to light touch, but sensation of slight tightness in the skin as well as decrease sensation relative to the contralateral foot which is predominantly occurring in the arch of his foot and wrapping around to the medial aspect to the dorsum of his foot and slightly up the gaona.    Salvatore does note that he has been having chronic back pain for quite some time.  Does not take any medication for this other than Tylenol as needed.  Has never had physical therapy before no recent imaging on file.  Has not noted whether or not change in position related to his back also affects the numbness in his foot, but when laying recumbent does not recall this then improving the sensation of numbness in his right foot.    Patient has also been referred for evaluation of right hip pain which is believed to be osteoarthritis.  Has not yet heard back on the status of referral to orthopedics.    Patient noted to have history of prior basal cell carcinoma.  Salvatore reports that since excision with Dr. Giron in this office, has not noted any new lesions in the same area.  Does have a forehead lesion however reports this occurred when he ran into a dresser drawer a few days ago causing some bleeding.    PMHx/PSHx:  Past Medical History[1]  Problem List[2]  Past Surgical History[3]    Allergies: Patient has no known allergies.    OBJECTIVE:  Vitals:    07/16/25 0913   BP: 117/75   Pulse: 83   Temp: 36.3 °C (97.4 °F)   SpO2: 96%     Vitals:    07/16/25 0913   BP: 117/75   Weight: 72.1 kg (159 lb)   Height: 1.753 m (5' 9\")       Physical Examination:  General: Well appearing male " in no acute distress, resting on arrival to room  HEENT: Normocephalic, atraumatic, EOMI  Cardiovascular: Skin pink, no acrocyanosis  Pulmonary: No tachypnea or retractions  MSK: Tenderness to palpation right SI joint though minimal pain also reported over L4/L5 vertebra region  Extremities: Moves all spontaneously, light touch sensation intact bilaterally via monofilament, no palpable crepitus or asymmetry between feet  Neurological: Alert and oriented    ASSESSMENT & PLAN:  Salvatore Bowen is a 80 y.o. male here for follow-up, with medical concerns as addressed below.    1. Numbness of right foot  DX-LUMBAR SPINE-4+ VIEWS    Referral to Pain Clinic      2. Chronic midline low back pain, unspecified whether sciatica present  DX-LUMBAR SPINE-4+ VIEWS    diclofenac sodium (VOLTAREN) 1 % Gel    Referral to Physical Therapy      3. Right hip pain  diclofenac sodium (VOLTAREN) 1 % Gel    Referral to Physical Therapy      4. History of basal cell carcinoma (BCC) of skin            Orders Placed This Encounter    DX-LUMBAR SPINE-4+ VIEWS    Referral to Pain Clinic    Referral to Physical Therapy    diclofenac sodium (VOLTAREN) 1 % Gel       # Numbness of right foot  Discussed with patient the distribution appears to be in the L5 lumbar region, with affected areas corresponding to dermatome.  Given chronic low back pain (see above), I question whether this is impingement peripherally versus at facet joint of the spine.  At this time have ordered x-rays of the spine for further evaluation.  Imaging referrals process discussed.  Advised patient I will always contact him with study results within 1 week of having a test performed, and to contact the office if he does not hear back in results during that time.  I have also referred patient to physiatry for EMG to determine location of nerve impingement which is likely causing symptoms.  Referrals process discussed.  Pending findings of above study, will consider further  "treatment as warranted.  Patient verbalized understanding and agreement with plan of care.    # Chronic midline low back pain, unspecified  Uncertain whether sciatica may be causing numbness of right foot as discussed above.  Ordered x-rays as above.  Have referred patient to physical therapy to see whether this makes any improvement in extent of symptoms.  Referrals process discussed.  Have also prescribed over-the-counter Voltaren 1% gel to be applied up to 4 times daily as needed for pain.    # Right hip pain  Prescribed diclofenac as above and referred to physical therapy.  Advised patient to schedule x-rays.    # History of basal cell carcinoma  This appears to be in remission.  Will continue to monitor closely with further skin checks as warranted.    Aaron Beebe M.D.               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  - Excised in Winter 2024. Margins obtained.  - 6 month check without growth  - Will require yearly follow up         Stroke (HCC) 1983    no residual weakness   [2]   Patient Active Problem List  Diagnosis    Atrial fibrillation (HCC)    Hypertension    DM (diabetes mellitus) (HCC)    Basal cell carcinoma (BCC) of skin of right upper extremity including shoulder    Actinic keratosis    Establishing care with new doctor, encounter for   [3]   Past Surgical History:  Procedure Laterality Date    LAPAROSCOPIC INGUINAL HERNIA REPAIR Right 8/6/2020    Procedure: REPAIR, HERNIA, INGUINAL, LAPAROSCOPIC;  Surgeon: Brad Camarillo M.D.;  Location: SURGERY [facility];  Service: Vascular    OTHER ABDOMINAL SURGERY  2019    hernia    OTHER NEUROLOGICAL SURG  1983    \"Put a plate in my skull r/t stroke\"     "

## 2025-07-21 DIAGNOSIS — R93.7 ABNORMAL X-RAY OF BONE: ICD-10-CM

## 2025-07-21 DIAGNOSIS — M25.551 RIGHT HIP PAIN: Primary | ICD-10-CM

## 2025-07-21 DIAGNOSIS — M89.8X9 RADIOLUCENT LESION OF BONE: ICD-10-CM

## 2025-07-22 ENCOUNTER — OFFICE VISIT (OUTPATIENT)
Dept: PHYSICAL MEDICINE AND REHAB | Facility: MEDICAL CENTER | Age: 80
End: 2025-07-22
Payer: MEDICARE

## 2025-07-22 VITALS
HEART RATE: 80 BPM | WEIGHT: 160.05 LBS | BODY MASS INDEX: 23.71 KG/M2 | TEMPERATURE: 97.6 F | SYSTOLIC BLOOD PRESSURE: 136 MMHG | DIASTOLIC BLOOD PRESSURE: 74 MMHG | OXYGEN SATURATION: 97 % | HEIGHT: 69 IN

## 2025-07-22 DIAGNOSIS — Z91.81 RISK FOR FALLS: ICD-10-CM

## 2025-07-22 DIAGNOSIS — M47.9 SPONDYLOSIS: ICD-10-CM

## 2025-07-22 DIAGNOSIS — Z71.3 DIETARY COUNSELING: ICD-10-CM

## 2025-07-22 DIAGNOSIS — M16.0 ARTHRITIS OF BOTH HIPS: ICD-10-CM

## 2025-07-22 DIAGNOSIS — M62.9 HAMSTRING TIGHTNESS OF BOTH LOWER EXTREMITIES: ICD-10-CM

## 2025-07-22 DIAGNOSIS — M43.16 SPONDYLOLISTHESIS OF LUMBAR REGION: ICD-10-CM

## 2025-07-22 DIAGNOSIS — Z71.82 EXERCISE COUNSELING: ICD-10-CM

## 2025-07-22 DIAGNOSIS — M47.816 FACET ARTHRITIS OF LUMBAR REGION: ICD-10-CM

## 2025-07-22 DIAGNOSIS — R20.0 NUMBNESS OF RIGHT FOOT: Primary | ICD-10-CM

## 2025-07-22 PROCEDURE — 3075F SYST BP GE 130 - 139MM HG: CPT | Performed by: GENERAL PRACTICE

## 2025-07-22 PROCEDURE — 1126F AMNT PAIN NOTED NONE PRSNT: CPT | Performed by: GENERAL PRACTICE

## 2025-07-22 PROCEDURE — 99204 OFFICE O/P NEW MOD 45 MIN: CPT | Performed by: GENERAL PRACTICE

## 2025-07-22 PROCEDURE — 3078F DIAST BP <80 MM HG: CPT | Performed by: GENERAL PRACTICE

## 2025-07-22 ASSESSMENT — FIBROSIS 4 INDEX: FIB4 SCORE: 2.24

## 2025-07-22 ASSESSMENT — PAIN SCALES - GENERAL: PAINLEVEL_OUTOF10: NO PAIN

## 2025-07-22 ASSESSMENT — PATIENT HEALTH QUESTIONNAIRE - PHQ9: CLINICAL INTERPRETATION OF PHQ2 SCORE: 0

## 2025-07-22 NOTE — PROGRESS NOTES
[facility] (Physical Medicine and Rehabilitation)  Interventional Pain and Spine       Patient Name: Salvatore Bowen   Patient : 1945  MRN: [id number]     Date of service: See epic    Referring provider: Aaron Beebe M.D.    CHIEF COMPLAINT  Chief Complaint   Patient presents with    Establish Care     Numbness of R-Foot         Salvatore Bowen is a very pleasant male  who presents today with The primary encounter diagnosis was Numbness of right foot. Diagnoses of Exercise counseling, Dietary counseling, Risk for falls, Spondylosis, Spondylolisthesis of lumbar region, Facet arthritis of lumbar region, Arthritis of both hips, and Hamstring tightness of both lower extremities were also pertinent to this visit.    Verbal consent was obtained for Sergo copilot: Yes      Medical records review:  I reviewed the note from the referring provider Aaron Beebe M.D. including the note dated 25.    Prior Relevant MSK/Interventional Procedures:       HISTORY OF PRESENTING ILLNESS:  History of Present Illness  The patient is a 80-year-old male presenting for right foot numbness, which began in mid 2025 without any apparent cause.    Right Foot Numbness  He reports full sensation to light touch but describes a slight tightness in the skin and decreased sensation compared to his left foot. This altered sensation is located at the arch of his right foot, extending around the medial aspect of the dorsum of his foot and slightly up his shin. He also experiences low back pain and was previously noted to have tenderness at the right sacroiliac (SI) joint. He reports a difference in sensation between his right and left foot, with the right foot feeling different. He experiences numbness in his right foot, particularly in the arch and dorsal aspect, but not in his left foot. He has no issues with walking. He has been managing his pain with Tylenol every 6 hours, which provides relief after some  time.  - Onset: Began in mid 06/2025.  - Location: Arch of the right foot, extending around the medial aspect of the dorsum of the foot and slightly up the shin.  - Character: Slight tightness in the skin and decreased sensation compared to the left foot; numbness in the right foot.  - Alleviating Factors: Tylenol every 6 hours provides relief after some time.  - Severity: No issues with walking.    Back, Hip, and Knee Pain  He also reports back and hip pain, which he believes are unrelated to his foot numbness. He occasionally experiences knee pain, which he attributes to his hip condition. He recently purchased a cane due to his hip pain. He has undergone x-rays and is scheduled for an MRI. He has an appointment with an orthopedic oncologist on 08/11/2025. He is curious about the possibility of a hip replacement and its potential impact on his ability to care for his wife. He has been using a cane for mobility due to hip pain. He used to walk a mile daily but has been unable to do so due to his pain.  - Onset: Not specified.  - Location: Back, hip, and knee.  - Character: Back and hip pain; occasional knee pain attributed to hip condition.  - Alleviating Factors: Using a cane for mobility.  - Timing: Scheduled for an MRI; appointment with an orthopedic oncologist on 08/11/2025.  - Severity: Unable to walk a mile daily due to pain.       ROS:   Fever, Chills, Sweats: Denies  Involuntary Weight Loss: Denies  Bowel/bladder issues: denies   See HPI.   All other systems reviewed and negative.     Psychological testing for pain as depression and pain commonly coexist and need to both be treated.     Opioid Risk Score: No value filed.      Interpretation of Opioid Risk Score   Score 0-3 = Low risk of abuse. Do UDS at least once per year.  Score 4-7 = Moderate risk of abuse. Do UDS 1-4 times per year.  Score 8+ = High risk of abuse. Refer to specialist.    PHQ      3/26/2025     8:30 AM 5/8/2025     1:00 PM 7/22/2025     "10:00 AM   Depression Screen (PHQ-2/PHQ-9)   PHQ-2 Total Score 0 0 0       Interpretation of PHQ-9 Total Score   Score Severity   1-4 No Depression   5-9 Mild Depression   10-14 Moderate Depression   15-19 Moderately Severe Depression   20-27 Severe Depression      PMHx:   Past Medical History[1]    PSHx:   Past Surgical History[2]    Family history   History reviewed. No pertinent family history.    Medications: reviewed on epic.   Active Medications[3]     Allergies:   Allergies[4]    Social Hx:   Social History     Socioeconomic History    Marital status: Single     Spouse name: Not on file    Number of children: Not on file    Years of education: Not on file    Highest education level: Not on file   Occupational History    Not on file   Tobacco Use    Smoking status: Never    Smokeless tobacco: Never   Vaping Use    Vaping status: Never Used   Substance and Sexual Activity    Alcohol use: Not Currently    Drug use: Not Currently     Types: Inhaled     Comment: methamphetamin (last used 4 yrs ago)    Sexual activity: Not on file   Other Topics Concern    Not on file   Social History Narrative    Not on file     Social Drivers of Health     Financial Resource Strain: Not on file   Food Insecurity: Not on file   Transportation Needs: Not on file   Physical Activity: Not on file   Stress: Not on file   Social Connections: Not on file   Intimate Partner Violence: Not on file   Housing Stability: Not on file         EXAMINATION   Vitals: /74 (BP Location: Right arm, Patient Position: Sitting, BP Cuff Size: Adult)   Pulse 80   Temp 36.4 °C (97.6 °F) (Temporal)   Ht 1.753 m (5' 9\")   Wt 72.6 kg (160 lb 0.9 oz)   SpO2 97%   Physical Exam:     Body Habitus: Body mass index is 23.64 kg/m².  Appearance: Well-groomed, well-nourished, not disheveled  Eyes: No scleral icterus to suggest severe liver disease, no proptosis to suggest severe hyperthyroid  ENT -no obvious auditory deficits, no external lesions, moist " mucus membranes   Skin -no rashes or lesions noted. No appreciable skin breakdown on exposed skin areas.    Respiratory-  breathing comfortably on room air, no audible wheezing, full sentences  Cardiovascular- No lower extremity edema noted.   Psychiatric- alert and oriented, calm, comfortable, cooperative     Musculoskeletal and Neuro:  Gait and station - antalgic gait with reciprocal pattern,  presence/use of ambulatory device using as needed, no arm assistance with sit-to-stand, nonantalgic. no loss of balance during exam.  No change in patient's demeanor with exam.    Grossly normal cranial nerve exam  Coordination grossly intact     Physical Exam  - Musculoskeletal:    - Right foot: Mild tenderness to palpation at the second metatarsal, no pain at the lateral medial malleolus or at the arches, negative squeeze test    - L2-S1: Manual motor testing 5 out of 5 except for right hip flexion which is 4 out of 5 due to pain    - Right gluteus eunice: Tenderness to palpation    - Midline paraspinal muscles, left gluteus eunice, greater trochanters bilaterally: No tenderness to palpation    - Legs: Straight leg raise test negative bilaterally, MSSA test negative bilaterally, Stewart's test popliteal angle less than 10 degrees, internal/external rotation equal but limited, DYLAN test negative bilaterally, right leg DYLAN test shows more distance from knee to exam table compared to the left    - Hip: Internal rotation painful on the right, external rotation guarded on the right  - Other: Negative Tinel test at bilateral fibular heads and at the lateral and medial malleolus at the nerve sites. Gait is antalgic with painful right hip      MEDICAL DECISION MAKING    Medical records review: see under HPI section.     DATA    Labs:   Lab Results   Component Value Date/Time    SODIUM 140 07/14/2025 10:52 AM    POTASSIUM 4.5 07/14/2025 10:52 AM    CHLORIDE 105 07/14/2025 10:52 AM    CO2 23 07/14/2025 10:52 AM    ANION 12.0  07/14/2025 10:52 AM    GLUCOSE 105 (H) 07/14/2025 10:52 AM    BUN 18 07/14/2025 10:52 AM    CREATININE 1.24 07/14/2025 10:52 AM    CALCIUM 9.4 07/14/2025 10:52 AM    ASTSGOT 22 07/14/2025 10:52 AM    ALTSGPT 30 07/14/2025 10:52 AM    TBILIRUBIN 0.7 07/14/2025 10:52 AM    ALBUMIN 4.2 07/14/2025 10:52 AM    TOTPROTEIN 7.9 07/14/2025 10:52 AM    GLOBULIN 3.7 (H) 07/14/2025 10:52 AM    AGRATIO 1.1 07/14/2025 10:52 AM   ]    Lab Results   Component Value Date/Time    PROTHROMBTM 15.0 (H) 08/06/2020 06:37 AM    INR 1.14 (H) 08/06/2020 06:37 AM        Lab Results   Component Value Date/Time    WBC 11.20 (H) 05/05/2025 07:13 AM    WBC 8.1 08/03/2020 10:03 AM    RBC 4.95 05/05/2025 07:13 AM    RBC 5.02 08/03/2020 10:03 AM    HEMOGLOBIN 16.1 05/05/2025 07:13 AM    HEMOGLOBIN 16.3 08/03/2020 10:03 AM    HEMATOCRIT 47.2 05/05/2025 07:13 AM    HEMATOCRIT 48.8 08/03/2020 10:03 AM    MCV 95.3 (H) 05/05/2025 07:13 AM    MCV 97.2 08/03/2020 10:03 AM    MCH 32.6 05/05/2025 07:13 AM    MCH 32.5 08/03/2020 10:03 AM    MCHC 34.2 05/05/2025 07:13 AM    MCHC 33.4 (L) 08/03/2020 10:03 AM    MPV 9.6 05/05/2025 07:13 AM    MPV 11.1 08/03/2020 10:03 AM    NEUTSPOLYS 57.9 05/05/2025 07:13 AM    NEUTSPOLYS 58.8 05/22/2013 08:55 PM    LYMPHOCYTES 26.7 05/05/2025 07:13 AM    LYMPHOCYTES 31.8 05/22/2013 08:55 PM    MONOCYTES 11.4 (H) 05/05/2025 07:13 AM    MONOCYTES 5.7 05/22/2013 08:55 PM    EOSINOPHILS 3.6 05/05/2025 07:13 AM    EOSINOPHILS 3.4 05/22/2013 08:55 PM    BASOPHILS 0.4 05/05/2025 07:13 AM    BASOPHILS 0.3 05/22/2013 08:55 PM        Lab Results   Component Value Date/Time    HBA1C 7.4 (A) 07/01/2025 10:25 AM        Imaging:   I personally reviewed following images, these are my reads    Lumbar x-ray 7/16/2025: Mild scoliosis with 9 degrees rightward curvature centered at L2-L3 and 11 degrees leftward curvature centered at L4-L5; Mild retrolisthesis L2 and L3; degeneration; osteophytes and endplate sclerosis; facet  arthropathy  Bilateral hip x-ray 2025: Mild osteoarthritic changes; scattered subcentimeter lucent lesions such as when measured at 9 mm;    IMAGING radiology reads. I reviewed the following radiology reads                                                                                          Results for orders placed during the hospital encounter of 25    DX-LUMBAR SPINE-4+ VIEWS    Impression  1.  Mild scoliosis.  2.  Degenerative changes of the lumbar spine.                          ASSESSMENT AND PLAN:  Salvatore Bowen   : 1945   Past medical history of T2DM, hypertension, basal cell carcinoma, atrial fibrillation, actinic keratosis    Diagnoses and all orders for this visit:  1. Numbness of right foot  EMG/NCS      2. Exercise counseling        3. Dietary counseling        4. Risk for falls        5. Spondylosis        6. Spondylolisthesis of lumbar region        7. Facet arthritis of lumbar region        8. Arthritis of both hips        9. Hamstring tightness of both lower extremities              Assessment & Plan  1. Right foot numbness:  - The numbness in the right foot does not follow a typical trajectory, suggesting a non-discrete, non-dermatomal pattern.  - An electrodiagnostic test will be ordered to further investigate the cause of the numbness. He is advised to shower either the night before or the morning of the test and to avoid applying lotions.    2. Scoliosis:  - The back x-ray reveals scoliosis with curvatures at 9 and 11 degrees, likely secondary to aging.  - This condition will be monitored closely, with a repeat x-ray scheduled in approximately one year to assess for any significant changes or progression.    3. Spondylosis:  - The back x-ray indicates spondylosis, characterized by age-related changes including osteophytes and facet arthropathy.  - These conditions can also contribute to pain. The situation will be monitored closely.    4. Retrolisthesis:  - The  back x-ray shows retrolisthesis, where the bones are slightly shifted back compared to others.  - This condition will be monitored closely.    5. Right hip pain:  - The patient has osteoarthritis, as evidenced by arthritic changes on the bone. Examination reveals limited internal and external rotation of the right hip.  - The radiologist's report indicates lesions on the hips.  An MRI has been ordered for further investigation.  - A previously placed referral to Dr. Camp, an orthopedic oncologist, has been made for further evaluation and assistance. He has been provided with exercises for his hip and back to strengthen the muscles. Information on fall prevention has also been given.    -A1C 7.4  -Scattered subcentimeter lucent lesions in the hips: Managed by PCP with an ordered MRI  -Recommend vitamin D screening; Hypovitaminosis D can contribute to diffuse musculoskeletal aches, fatigue, and malaise and can affect exercise and therapy  -Lifestyle: Dietary and exercise counseling and fall risk prevention discussed.    Orders Placed This Encounter    EMG/NCS       -Medications/Modalities: Previously prescribed medications; APAP as needed  -Therapy (PT/OT/Aquatherapy): Home exercise program: encouraged and provided home exercises of regular strengthening and stretching.   -Diagnostic workup: reviewed today as above; ordered X-ray of R foot, NCS/EMG,  pending MRI  -Interventional program: not indicated at this time and will consider at a later time  -Referrals: none required at this time      Follow-up: EMG/NCS     Please note that this dictation was created using voice recognition software. I have made every reasonable attempt to correct obvious errors but there may be errors of grammar and content that I may have overlooked prior to finalization of this note.      Bertrand Evans DO  Interventional Pain and Spine  Physical Medicine and Rehabilitation  [facility]         CC Elmer Merida M.D.   CC  "Aaron Beebe M.D.         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  - Excised in Winter 2024. Margins obtained.  - 6 month check without growth  - Will require yearly follow up         Stroke (HCC) 1983    no residual weakness   [2]   Past Surgical History:  Procedure Laterality Date    LAPAROSCOPIC INGUINAL HERNIA REPAIR Right 8/6/2020    Procedure: REPAIR, HERNIA, INGUINAL, LAPAROSCOPIC;  Surgeon: Brad Camarillo M.D.;  Location: SUR[facility]ERY [facility];  Service: Vascular    OTHER ABDOMINAL SURGERY  2019    hernia    OTHER NEUROLOGICAL SURG  1983    \"Put a plate in my skull r/t stroke\"   [3]   Outpatient Medications Marked as Taking for the 7/22/25 encounter (Office Visit) with Bertrand Evans D.O.   Medication Sig Dispense Refill    diclofenac sodium (VOLTAREN) 1 % Gel Apply 2 g topically 2 times a day as needed (back or hip pain). 50 g 1    metFORMIN ER (GLUCOPHAGE XR) 500 MG TABLET SR 24 HR Take 1 Tablet by mouth every day. After two weeks if no stomach issues you can increase to 2 pills per day. Take with meals 100 Tablet 3    linagliptin (TRADJENTA) 5 MG Tab tablet Take 1 Tablet by mouth every day. 90 Tablet 4    tamsulosin (FLOMAX) 0.4 MG capsule TAKE 1 CAPSULE(0.4 MG) BY MOUTH DAILY 30 Capsule 2    atorvastatin (LIPITOR) 40 MG Tab Take 1 Tablet by mouth every evening. 90 Tablet 3    digoxin (LANOXIN) 250 MCG Tab 1 po daily      warfarin (COUMADIN) 2.5 MG Tab Take 2.5 mg by mouth every day.      lisinopril (PRINIVIL) 5 MG Tab Take 5 mg by mouth every bedtime.     [4] No Known Allergies    "

## 2025-07-22 NOTE — Clinical Note
Dr Elmer Merida and Dr Concepción Chase Omid Bowen was evaluated for his right foot numbness and will proceed with an EMG/nerve conduction study.  I did see that you place a referral to Dr. Camp for evaluation of the hip lesions.  If needed, I can also provide intra-articular hip injections if indicated after Dr. Camp's evaluation.  May consider vitamin D level as hypovitaminosis can lead to decreased tolerance in therapy and exercise and lead to muscle and bone pain.  Thank you for the referral.   Please text, voalte, or call me if further questions.       Best Regards,   Bertrand Evans, DO   Physical Medicine and Rehabilitation

## 2025-07-22 NOTE — Clinical Note
Dr Camp,  You have an upcomging appt with Salvatore Bowen for the scattered lesions seen on his hip x-ray.  Currently he is presenting with a hip osteoarthritis picture.  He does have a pending MRI and if it is negative I can proceed with an intra-articular injection if that is indicated after your assessment.  Thank you for the referral.   Please text, voalte, or call me if further questions.       Best Regards,   Bertrand Evans, DO   Physical Medicine and Rehabilitation

## 2025-07-22 NOTE — PATIENT INSTRUCTIONS
"Fall prevention  -please wear indoor closed toe shoes.  Avoid high heels, floppy slippers, socks, without and shoes with slick soles that can make you slips, stumble, and fall  -work on single leg balance  -Remove boxes, newspapers, electrical cords and phone cords from walkways.  -Move coffee tables, magazine racks and plant stands from high-traffic areas.  -Secure loose rugs with double-faced tape, tacks or a slip-resistant backing -- or remove loose rugs from your home.  -Repair loose, wooden floorboards and carpeting right away.  -Store clothing, dishes, food and other necessities within easy reach.  -Immediately clean spilled liquids, grease or food.  -Use nonslip mats in your bathtub or shower. Use a bath seat, which allows you to sit while showering.  Her  -keep home well lit  -Place night lights in your bedroom, bathroom and hallways.  -Place a lamp within reach of your bed in case you need to get up in the middle of the night.  -Make clear paths to light switches that aren't near room entrances. Consider trading traditional switches for glow-in-the-dark or illuminated switches.  -Turn on the lights before going up or down stairs.  -Store flashlights in easy-to-find places in case of power outages.  -Review medications with PCP  -Handrails for both sides of stairways  -Nonslip treads for bare-wood steps  -A raised toilet seat or one with armrests  -Grab bars for the shower or tub  -A sturdy plastic seat for the shower or tub -- plus a hand-held shower nozzle for bathing while sitting down  -Continue physical activity as tolerated such as walking, water workouts, or fox chi  https://www.[facility].org/healthy-lifestyle/healthy-aging/in-depth/fall-prevention/art-17307590     Diet  \"-Emphasizes fruits, vegetables, whole grains, and fat-free or low-fat milk and milk products  -Includes a variety of protein foods such as seafood, lean meats and poultry, eggs, legumes (beans and peas), soy products, nuts, and " "seeds.  -Is low in added sugars, sodium, saturated fats, trans fats, and cholesterol.  -Stays within your daily calorie needs\"  https://www.cdc.gov/healthyweight/healthy_eating/index.html    Exercise  \"We know 150 minutes of physical activity each week sounds like a lot, but you don’t have to do it all at once. It could be 30 minutes a day, 5 days a week. You can spread your activity out during the week and break it up into smaller chunks of time.\"  https://www.cdc.gov/physicalactivity/basics/adults/index.htm  \"Exercise, multidisciplinary rehabilitation, acupuncture, CBT, and mind-body practices were most consistently associated with durable slight to moderate improvements in function and pain for specific chronic pain conditions. \"  https://effectivehealthcare.ahrq.gov/products/nonpharma-treatment-pain/research-2018    "

## 2025-07-23 ENCOUNTER — HOSPITAL ENCOUNTER (OUTPATIENT)
Dept: RADIOLOGY | Facility: MEDICAL CENTER | Age: 80
End: 2025-07-23
Attending: GENERAL PRACTICE
Payer: MEDICARE

## 2025-07-23 DIAGNOSIS — R20.0 NUMBNESS OF RIGHT FOOT: ICD-10-CM

## 2025-07-23 PROCEDURE — 73630 X-RAY EXAM OF FOOT: CPT | Mod: RT

## 2025-07-29 ENCOUNTER — OFFICE VISIT (OUTPATIENT)
Dept: PHYSICAL MEDICINE AND REHAB | Facility: MEDICAL CENTER | Age: 80
End: 2025-07-29
Attending: GENERAL PRACTICE
Payer: MEDICARE

## 2025-07-29 VITALS
DIASTOLIC BLOOD PRESSURE: 78 MMHG | OXYGEN SATURATION: 97 % | HEIGHT: 69 IN | WEIGHT: 160 LBS | HEART RATE: 93 BPM | SYSTOLIC BLOOD PRESSURE: 148 MMHG | BODY MASS INDEX: 23.7 KG/M2 | TEMPERATURE: 97.7 F

## 2025-07-29 DIAGNOSIS — M25.512 CHRONIC LEFT SHOULDER PAIN: Primary | ICD-10-CM

## 2025-07-29 DIAGNOSIS — G89.29 CHRONIC RIGHT SHOULDER PAIN: ICD-10-CM

## 2025-07-29 DIAGNOSIS — M25.511 CHRONIC RIGHT SHOULDER PAIN: ICD-10-CM

## 2025-07-29 DIAGNOSIS — R20.0 NUMBNESS OF RIGHT FOOT: ICD-10-CM

## 2025-07-29 DIAGNOSIS — G89.29 CHRONIC LEFT SHOULDER PAIN: Primary | ICD-10-CM

## 2025-07-29 ASSESSMENT — FIBROSIS 4 INDEX: FIB4 SCORE: 2.24

## 2025-07-29 ASSESSMENT — PAIN SCALES - GENERAL: PAINLEVEL_OUTOF10: 3=SLIGHT PAIN

## 2025-07-29 ASSESSMENT — PATIENT HEALTH QUESTIONNAIRE - PHQ9: CLINICAL INTERPRETATION OF PHQ2 SCORE: 0

## 2025-07-29 NOTE — Clinical Note
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  There is no strong evidence of diabetic neuropathy but this could also be age-related changes.  We can consider retesting in 3 to 6 months.  In addition, he was having concerns of shoulder pain which I can evaluate him.  He  Thank you for the referral.   Please text, voalte, or call me if further questions.       Best Regards,   Bertrand Evans, DO   Physical Medicine and Rehabilitation

## 2025-07-29 NOTE — PROCEDURES
"Nerve Conduction Study and Electromyography Report          Name: Salvatore Bowen    MRN: [id number]   YOB: 1945 (80 y.o.)   Sex: male   Vitals:  Vitals:    07/29/25 1305   BP: (!) 148/78   Weight: 72.6 kg (160 lb)   Height: 1.753 m (5' 9\")     Body mass index is 23.63 kg/m².    Examining Physician: Bertrand Evans D.O.     Visit Diagnoses     ICD-10-CM   1. Numbness of right foot  R20.0       NCS/EMG Examination Date: 7/29/2025     Impression:      This is a mostly normal study.   There is no electrodiagnostic evidence of RIGHT lower extremity large fiber neuropathy, plexopathy or radiculopathy in segments tested.  There may be age-related changes to sensory. Clinical correlation advised.   There is no evidence of LEFT radial mononeuropathy, brachial plexopathy, large fiber neuropathy, or cervical radiculopathy.      Recommendations:   - Findings discussed with patient and shared with the PCP and/or referring clinician  - May consider repeat EMG/NCS testing in 3-6 months.         Nerve Conduction Studies and Electromyography  Examination Findings:      Nerve Conduction Studies Examination Findings:         LEFT  sural sensory nerve action potential (SNAP) amplitude is borderline normal and peak latency are normal.   LEFT radial sensory nerve action potential (SNAP) amplitude and peak latency are normal.   RIGHT sural sensory nerve action potential (SNAP) amplitude is abnormal and peak latency is normal.     RIGHT  fibular compound muscle action potential (CMAP) amplitude, distal latency, conduction velocity are normal.   RIGHT  tibial compound muscle action potential (CMAP) amplitude, distal latency, conduction velocity  are normal.     Motor Nerve Results      Latency Amplitude F-Lat Segment Distance CV Comment   Site (ms) Norm (mV) Norm (ms)  (cm) (m/s) Norm    Right Fibular (TA) Motor   Fib head 2.2  < 6.7 1.60  > 1.5         Pop fossa 3.8  < 6.7 1.52  > 1.5  Pop fossa-Fib head 9 56  > 37  "   Right Tibial (AH) Motor   Ankle 4.7  < 5.8 7.2  > 1.1         Knee 12.3 - 6.7 -  Knee-Ankle 36.5 48  > 34      Sensory Sites      Neg Peak Lat Amplitude (O-P) Segment Distance Velocity Comment   Site (ms) Norm (µV) Norm  (cm) (ms)    Left Radial Sensory   Forearm-Wrist 2.1  < 2.9 22  >4 Forearm-Wrist 10     Left Sural Sensory   Calf-Lat mall 3.6  < 4.4 4  > 4 Calf-Lat mall 14     Right Sural Sensory   Calf-Lat mall 2.2  < 4.4 3  > 4 Calf-Lat mall 14     This may be age-related changes       Side Muscle Nerve Root Ins.Act Fibs Psw Amp Dur Poly Recrt Int.Pat Comment   Right Tib ant Deep fib,  Fibular L4-L5 Nml 2+ Nml Incr Nml 0 Nml Nml    Right Fib longus Fibular,  Superficial... L5-S1 Nml 2+ Nml Incr Nml 0 Nml Nml    Right Gastroc Tibial S1-S2 Nml Nml Nml Incr Nml 0 Nml Nml    Right Vastus med Femoral L2-L4 Nml Nml Nml Nml Nml 0 Nml Nml    Right Gluteus med Sup gluteal L5-S1 Nml Nml Nml Nml Nml 0 Nml Nml    No congruency of radiculopathy of the L4, L5, or S1 nerve roots.      Nerve conduction studies (NCS) and electromyography (EMG) are utilized to evaluate direct or indirect damage to the peripheral nervous system. NCS are performed to measure the nerve(s) response(s) to electrostimulation across a given nerve segment. EMG evaluates the passive and active electrical activity of the muscle(s) in question.  Muscles are innervated by specific peripheral nerves and roots. Often times, several nerves the muscle to be examined in order to determine the presence or absence of the disease process. Furthermore, nerves and muscles may need to be tested in a [id number] comparison, as well as in additional extremities, as this may be crucial in characterizing the extent of the disease process, which may be diffuse or isolated and of varying degree of severity. The extent of the neurodiagnostic exam is justified as it may help arrive to a proper diagnosis, which ultimately may contribute to better management of the patient.  Therefore, the nerves to muscles examined during the study were medically necessary.    The patient tolerated testing well, without any complications. The study was done with a concentric needle examination.   Reference values are from the [facility] normative data guidelines and [employer] related to age guidelines.     This information is more accurate than what was recorded on the EMG computer.     cpt 47849. Nerve conduction studies,  5-6 studies  CPT 12331. needle electromyography, complete, each extremity.       Bertrand Evans D.O.

## 2025-07-29 NOTE — PROGRESS NOTES
[facility]  Interventional Pain and Spine       Patient Name: Salvatore Bowen   Patient : 1945  MRN: [id number]     Date of service: See epic    Referring provider: Aaron Beebe M.D.    CHIEF COMPLAINT  Chief Complaint   Patient presents with    Follow-Up     EMG - RLE         Salvatore Bowen is a very pleasant male  who presents today with The primary encounter diagnosis was Chronic left shoulder pain. Diagnoses of Numbness of right foot and Chronic right shoulder pain were also pertinent to this visit.    Verbal consent was obtained for [employer] copilot: Yes      Medical records review:  I reviewed the note from the referring provider Aaron Beebe M.D. including the note dated 25.    Prior Relevant MSK/Interventional Procedures:     My prior notes  appointment with an orthopedic oncologist on 2025. He is curious about the possibility of a hip replacement and its potential impact on his ability to care for his wife. He has been using a cane for mobility due to hip pain. He used to walk a mile daily but has been unable to do so due to his pain.        HISTORY OF PRESENTING ILLNESS:  History of Present Illness   The patient is a 80-year-old male presenting for shoulder pain, foot pain, and hip pain.    He has been managing his pain with Tylenol for over a month but is seeking to discontinue its use. He reports that his foot condition remains unchanged. He expresses concern about potential poor blood circulation in his foot. He reports no foot pain.    He has an upcoming MRI appointment on 10/15/2025 for his back and hip, which he has requested to be rescheduled. He is able to lie on his back without discomfort. He has noticed recent issues with his left hip. He also mentions that the lower part of his leg becomes bothersome when exposed to cold air from an air conditioner.    He has recently started experiencing shoulder pain,  particularly in the mornings. The pain intensifies when he reaches overhead or moves his arm backward. Occasionally, he struggles with putting on his shirt due to the shoulder pain. However, the pain does not disrupt his sleep. He sleeps with two pillows and often places his arm under his head, leading him to suspect that this position might be causing muscle strain in his shoulder blade.       ROS:   Fever, Chills, Sweats: Denies  Involuntary Weight Loss: Denies  Bowel/bladder issues: denies   See HPI.   All other systems reviewed and negative.     Psychological testing for pain as depression and pain commonly coexist and need to both be treated.     Opioid Risk Score: No value filed.      Interpretation of Opioid Risk Score   Score 0-3 = Low risk of abuse. Do UDS at least once per year.  Score 4-7 = Moderate risk of abuse. Do UDS 1-4 times per year.  Score 8+ = High risk of abuse. Refer to specialist.    PHQ      5/8/2025     1:00 PM 7/22/2025    10:00 AM 7/29/2025     1:00 PM   Depression Screen (PHQ-2/PHQ-9)   PHQ-2 Total Score 0 0 0       Interpretation of PHQ-9 Total Score   Score Severity   1-4 No Depression   5-9 Mild Depression   10-14 Moderate Depression   15-19 Moderately Severe Depression   20-27 Severe Depression      PMHx:   Past Medical History[1]    PSHx:   Past Surgical History[2]    Family history   History reviewed. No pertinent family history.    Medications: reviewed on epic.   Active Medications[3]     Allergies:   Allergies[4]    Social Hx:   Social History     Socioeconomic History    Marital status: Single     Spouse name: Not on file    Number of children: Not on file    Years of education: Not on file    Highest education level: Not on file   Occupational History    Not on file   Tobacco Use    Smoking status: Never    Smokeless tobacco: Never   Vaping Use    Vaping status: Never Used   Substance and Sexual Activity    Alcohol use: Not Currently    Drug use: Not Currently     Types:  "Inhaled     Comment: methamphetamin (last used 4 yrs ago)    Sexual activity: Not on file   Other Topics Concern    Not on file   Social History Narrative    Not on file     Social Drivers of Health     Financial Resource Strain: Not on file   Food Insecurity: Not on file   Transportation Needs: Not on file   Physical Activity: Not on file   Stress: Not on file   Social Connections: Not on file   Intimate Partner Violence: Not on file   Housing Stability: Not on file         EXAMINATION   Vitals: BP (!) 148/78 (BP Location: Right arm, Patient Position: Sitting, BP Cuff Size: Adult)   Pulse 93   Temp 36.5 °C (97.7 °F) (Temporal)   Ht 1.753 m (5' 9\")   Wt 72.6 kg (160 lb)   SpO2 97%   Physical Exam:     Body Habitus: Body mass index is 23.63 kg/m².  Appearance: Well-groomed, well-nourished, not disheveled  Eyes: No scleral icterus to suggest severe liver disease, no proptosis to suggest severe hyperthyroid  ENT -no obvious auditory deficits, no external lesions, moist mucus membranes   Skin -no rashes or lesions noted. No appreciable skin breakdown on exposed skin areas.    Respiratory-  breathing comfortably on room air, no audible wheezing, full sentences  Cardiovascular- No lower extremity edema noted.   Psychiatric- alert and oriented, calm, comfortable, cooperative     Musculoskeletal and Neuro:  Gait and station - antalgic gait with reciprocal pattern,  presence/use of ambulatory device using as needed, no arm assistance with sit-to-stand, nonantalgic. no loss of balance during exam.  No change in patient's demeanor with exam.    Grossly normal cranial nerve exam  Coordination grossly intact     Physical Exam  Musculoskeletal  - Right shoulder: Passive range of motion: internal rotation 80 degrees, external rotation 90 degrees, negative [place]'s test, negative Soriano test  - Left shoulder: Examination similar to the right shoulder      MEDICAL DECISION MAKING    Medical records review: see under HPI " section.     DATA    Labs:   Lab Results   Component Value Date/Time    SODIUM 140 07/14/2025 10:52 AM    POTASSIUM 4.5 07/14/2025 10:52 AM    CHLORIDE 105 07/14/2025 10:52 AM    CO2 23 07/14/2025 10:52 AM    ANION 12.0 07/14/2025 10:52 AM    GLUCOSE 105 (H) 07/14/2025 10:52 AM    BUN 18 07/14/2025 10:52 AM    CREATININE 1.24 07/14/2025 10:52 AM    CALCIUM 9.4 07/14/2025 10:52 AM    ASTSGOT 22 07/14/2025 10:52 AM    ALTSGPT 30 07/14/2025 10:52 AM    TBILIRUBIN 0.7 07/14/2025 10:52 AM    ALBUMIN 4.2 07/14/2025 10:52 AM    TOTPROTEIN 7.9 07/14/2025 10:52 AM    GLOBULIN 3.7 (H) 07/14/2025 10:52 AM    AGRATIO 1.1 07/14/2025 10:52 AM   ]    Lab Results   Component Value Date/Time    PROTHROMBTM 15.0 (H) 08/06/2020 06:37 AM    INR 1.14 (H) 08/06/2020 06:37 AM        Lab Results   Component Value Date/Time    WBC 11.20 (H) 05/05/2025 07:13 AM    WBC 8.1 08/03/2020 10:03 AM    RBC 4.95 05/05/2025 07:13 AM    RBC 5.02 08/03/2020 10:03 AM    HEMOGLOBIN 16.1 05/05/2025 07:13 AM    HEMOGLOBIN 16.3 08/03/2020 10:03 AM    HEMATOCRIT 47.2 05/05/2025 07:13 AM    HEMATOCRIT 48.8 08/03/2020 10:03 AM    MCV 95.3 (H) 05/05/2025 07:13 AM    MCV 97.2 08/03/2020 10:03 AM    MCH 32.6 05/05/2025 07:13 AM    MCH 32.5 08/03/2020 10:03 AM    MCHC 34.2 05/05/2025 07:13 AM    MCHC 33.4 (L) 08/03/2020 10:03 AM    MPV 9.6 05/05/2025 07:13 AM    MPV 11.1 08/03/2020 10:03 AM    NEUTSPOLYS 57.9 05/05/2025 07:13 AM    NEUTSPOLYS 58.8 05/22/2013 08:55 PM    LYMPHOCYTES 26.7 05/05/2025 07:13 AM    LYMPHOCYTES 31.8 05/22/2013 08:55 PM    MONOCYTES 11.4 (H) 05/05/2025 07:13 AM    MONOCYTES 5.7 05/22/2013 08:55 PM    EOSINOPHILS 3.6 05/05/2025 07:13 AM    EOSINOPHILS 3.4 05/22/2013 08:55 PM    BASOPHILS 0.4 05/05/2025 07:13 AM    BASOPHILS 0.3 05/22/2013 08:55 PM        Lab Results   Component Value Date/Time    HBA1C 7.4 (A) 07/01/2025 10:25 AM        Imaging:   I personally reviewed following images, these are my reads    Lumbar x-ray 7/16/2025: Mild  scoliosis with 9 degrees rightward curvature centered at L2-L3 and 11 degrees leftward curvature centered at L4-L5; Mild retrolisthesis L2 and L3; degeneration; osteophytes and endplate sclerosis; facet arthropathy  Bilateral hip x-ray 2025: Mild osteoarthritic changes; scattered subcentimeter lucent lesions such as when measured at 9 mm;    IMAGING radiology reads. I reviewed the following radiology reads                                                                                          Results for orders placed during the hospital encounter of 25    DX-LUMBAR SPINE-4+ VIEWS    Impression  1.  Mild scoliosis.  2.  Degenerative changes of the lumbar spine.                          ASSESSMENT AND PLAN:  Salvatore Bowen   : 1945   Past medical history of T2DM, hypertension, basal cell carcinoma, atrial fibrillation, actinic keratosis    Diagnoses and all orders for this visit:  1. Chronic left shoulder pain  DX-SHOULDER 2+ LEFT      2. Numbness of right foot  EMG/NCS    MR-LUMBAR SPINE-W/O      3. Chronic right shoulder pain  DX-SHOULDER 2+ RIGHT            Assessment & Plan  Shoulder pain:  The patient reports shoulder pain, particularly in the morning and when reaching overhead. Physical examination revealed negative Terry's test and negative Soriano test, with passive range of motion showing internal rotation 80 degrees and external rotation 90 degrees. X-rays of the shoulders will be obtained to screen for any contributing processes.    Foot pain:  The patient reports no changes in foot pain, which remains the same. Foot x-ray showed osteoarthritis. Physical examination noted symmetrical hair loss on the legs, suggesting a possible vascular cause. Discussion included the possibility of diabetic neuropathy contributing to sensory abnormalities. The patient was advised to see a podiatrist for further evaluation and management. Conservative treatment focusing on ensuring comfort for  the foot. Referral to a podiatrist is recommended.    Hip pain:  The patient reports that his left hip is starting to act up. An MRI of the hips is scheduled for 10/15/2025. The patient is advised to contact the imaging department to see if they can add an MRI of the lumbar region as well.  Upcoming appointment with Dr. Camp    Follow-up: The patient will follow up in a couple of weeks.    -A1C 7.4  -Scattered subcentimeter lucent lesions in the hips: Managed by PCP with an ordered MRI  -Recommend vitamin D screening; Hypovitaminosis D can contribute to diffuse musculoskeletal aches, fatigue, and malaise and can affect exercise and therapy  -Lifestyle: Dietary and exercise counseling and fall risk prevention discussed.    Orders Placed This Encounter    DX-SHOULDER 2+ LEFT    DX-SHOULDER 2+ RIGHT    MR-LUMBAR SPINE-W/O       -Medications/Modalities: Previously prescribed medications; APAP as needed  -Therapy (PT/OT/Aquatherapy): Home exercise program: encouraged and provided home exercises of regular strengthening and stretching.   -Diagnostic workup: reviewed today as above; ordered x-ray of shoulders and MRI of lumbar spine  -Interventional program: not indicated at this time and will consider at a later time  -Referrals: none required at this time    Please note that this dictation was created using voice recognition software. I have made every reasonable attempt to correct obvious errors but there may be errors of grammar and content that I may have overlooked prior to finalization of this note.      Bertrand Evans DO  Interventional Pain and Spine  Physical Medicine and Rehabilitation  [facility]         CC Elmer Merida M.D.   Aaron Palomino M.D.           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  -  "Excised in Winter 2024. Margins obtained.  - 6 month check without growth  - Will require yearly follow up         Stroke (HCC) 1983    no residual weakness   [2]   Past Surgical History:  Procedure Laterality Date    LAPAROSCOPIC INGUINAL HERNIA REPAIR Right 8/6/2020    Procedure: REPAIR, HERNIA, INGUINAL, LAPAROSCOPIC;  Surgeon: Brad Camarillo M.D.;  Location: SURGERY [facility];  Service: Vascular    OTHER ABDOMINAL SURGERY  2019    hernia    OTHER NEUROLOGICAL SURG  1983    \"Put a plate in my skull r/t stroke\"   [3]   Outpatient Medications Marked as Taking for the 7/29/25 encounter (Office Visit) with Bertrand Evans D.O.   Medication Sig Dispense Refill    diclofenac sodium (VOLTAREN) 1 % Gel Apply 2 g topically 2 times a day as needed (back or hip pain). 50 g 1    metFORMIN ER (GLUCOPHAGE XR) 500 MG TABLET SR 24 HR Take 1 Tablet by mouth every day. After two weeks if no stomach issues you can increase to 2 pills per day. Take with meals 100 Tablet 3    linagliptin (TRADJENTA) 5 MG Tab tablet Take 1 Tablet by mouth every day. 90 Tablet 4    tamsulosin (FLOMAX) 0.4 MG capsule TAKE 1 CAPSULE(0.4 MG) BY MOUTH DAILY 30 Capsule 2    atorvastatin (LIPITOR) 40 MG Tab Take 1 Tablet by mouth every evening. 90 Tablet 3    digoxin (LANOXIN) 250 MCG Tab 1 po daily      warfarin (COUMADIN) 2.5 MG Tab Take 2.5 mg by mouth every day.      lisinopril (PRINIVIL) 5 MG Tab Take 5 mg by mouth every bedtime.     [4] No Known Allergies    "

## 2025-07-30 ENCOUNTER — HOSPITAL ENCOUNTER (OUTPATIENT)
Dept: RADIOLOGY | Facility: MEDICAL CENTER | Age: 80
End: 2025-07-30
Attending: GENERAL PRACTICE
Payer: MEDICARE

## 2025-07-30 DIAGNOSIS — G89.29 CHRONIC LEFT SHOULDER PAIN: ICD-10-CM

## 2025-07-30 DIAGNOSIS — M25.512 CHRONIC LEFT SHOULDER PAIN: ICD-10-CM

## 2025-07-30 DIAGNOSIS — G89.29 CHRONIC RIGHT SHOULDER PAIN: ICD-10-CM

## 2025-07-30 DIAGNOSIS — M25.511 CHRONIC RIGHT SHOULDER PAIN: ICD-10-CM

## 2025-07-30 PROCEDURE — 73030 X-RAY EXAM OF SHOULDER: CPT | Mod: LT

## 2025-08-08 ENCOUNTER — APPOINTMENT (OUTPATIENT)
Dept: RADIOLOGY | Facility: MEDICAL CENTER | Age: 80
End: 2025-08-08
Payer: MEDICARE

## 2025-08-11 ENCOUNTER — OFFICE VISIT (OUTPATIENT)
Facility: MEDICAL CENTER | Age: 80
End: 2025-08-11
Payer: MEDICARE

## 2025-08-11 VITALS
HEART RATE: 99 BPM | DIASTOLIC BLOOD PRESSURE: 88 MMHG | OXYGEN SATURATION: 97 % | SYSTOLIC BLOOD PRESSURE: 130 MMHG | HEIGHT: 69 IN | BODY MASS INDEX: 22.86 KG/M2 | WEIGHT: 154.32 LBS | TEMPERATURE: 98.4 F

## 2025-08-11 DIAGNOSIS — M89.8X5 LYTIC BONE LESION OF HIP: ICD-10-CM

## 2025-08-11 DIAGNOSIS — M16.11 PRIMARY OSTEOARTHRITIS OF RIGHT HIP: Primary | ICD-10-CM

## 2025-08-11 PROCEDURE — 3079F DIAST BP 80-89 MM HG: CPT | Performed by: ORTHOPAEDIC SURGERY

## 2025-08-11 PROCEDURE — 3075F SYST BP GE 130 - 139MM HG: CPT | Performed by: ORTHOPAEDIC SURGERY

## 2025-08-11 PROCEDURE — 99204 OFFICE O/P NEW MOD 45 MIN: CPT | Performed by: ORTHOPAEDIC SURGERY

## 2025-08-11 ASSESSMENT — ENCOUNTER SYMPTOMS
MYALGIAS: 0
FEVER: 0
WEAKNESS: 0
CHILLS: 0
WEIGHT LOSS: 0
SHORTNESS OF BREATH: 0
SENSORY CHANGE: 0

## 2025-08-11 ASSESSMENT — FIBROSIS 4 INDEX: FIB4 SCORE: 2.24

## 2025-08-12 ENCOUNTER — APPOINTMENT (OUTPATIENT)
Dept: RADIOLOGY | Facility: MEDICAL CENTER | Age: 80
End: 2025-08-12
Payer: MEDICARE

## 2025-08-13 ENCOUNTER — HOSPITAL ENCOUNTER (OUTPATIENT)
Dept: RADIOLOGY | Facility: MEDICAL CENTER | Age: 80
End: 2025-08-13
Payer: MEDICARE

## 2025-08-13 ENCOUNTER — HOSPITAL ENCOUNTER (OUTPATIENT)
Dept: RADIOLOGY | Facility: MEDICAL CENTER | Age: 80
End: 2025-08-13
Attending: GENERAL PRACTICE
Payer: MEDICARE

## 2025-08-13 ENCOUNTER — OFFICE VISIT (OUTPATIENT)
Dept: PHYSICAL MEDICINE AND REHAB | Facility: MEDICAL CENTER | Age: 80
End: 2025-08-13
Payer: MEDICARE

## 2025-08-13 VITALS
TEMPERATURE: 97.9 F | HEIGHT: 69 IN | OXYGEN SATURATION: 98 % | DIASTOLIC BLOOD PRESSURE: 78 MMHG | SYSTOLIC BLOOD PRESSURE: 138 MMHG | HEART RATE: 91 BPM | WEIGHT: 154 LBS | BODY MASS INDEX: 22.81 KG/M2

## 2025-08-13 DIAGNOSIS — M47.9 SPONDYLOSIS: ICD-10-CM

## 2025-08-13 DIAGNOSIS — M89.8X9 RADIOLUCENT LESION OF BONE: ICD-10-CM

## 2025-08-13 DIAGNOSIS — Z71.3 DIETARY COUNSELING: ICD-10-CM

## 2025-08-13 DIAGNOSIS — M25.511 CHRONIC RIGHT SHOULDER PAIN: ICD-10-CM

## 2025-08-13 DIAGNOSIS — M25.551 RIGHT HIP PAIN: ICD-10-CM

## 2025-08-13 DIAGNOSIS — G62.9 NEUROPATHY: ICD-10-CM

## 2025-08-13 DIAGNOSIS — M47.816 FACET ARTHRITIS OF LUMBAR REGION: ICD-10-CM

## 2025-08-13 DIAGNOSIS — R20.0 NUMBNESS OF RIGHT FOOT: ICD-10-CM

## 2025-08-13 DIAGNOSIS — R93.7 ABNORMAL X-RAY OF BONE: ICD-10-CM

## 2025-08-13 DIAGNOSIS — G89.29 CHRONIC LEFT SHOULDER PAIN: ICD-10-CM

## 2025-08-13 DIAGNOSIS — M16.0 ARTHRITIS OF BOTH HIPS: ICD-10-CM

## 2025-08-13 DIAGNOSIS — M62.9 HAMSTRING TIGHTNESS OF BOTH LOWER EXTREMITIES: ICD-10-CM

## 2025-08-13 DIAGNOSIS — M19.011 ARTHRITIS OF RIGHT ACROMIOCLAVICULAR JOINT: ICD-10-CM

## 2025-08-13 DIAGNOSIS — G89.29 CHRONIC RIGHT SHOULDER PAIN: ICD-10-CM

## 2025-08-13 DIAGNOSIS — M25.512 CHRONIC LEFT SHOULDER PAIN: ICD-10-CM

## 2025-08-13 DIAGNOSIS — M43.16 SPONDYLOLISTHESIS OF LUMBAR REGION: ICD-10-CM

## 2025-08-13 DIAGNOSIS — R20.0 NUMBNESS OF RIGHT FOOT: Primary | ICD-10-CM

## 2025-08-13 DIAGNOSIS — Z91.81 RISK FOR FALLS: ICD-10-CM

## 2025-08-13 DIAGNOSIS — M19.012 ARTHRITIS OF LEFT ACROMIOCLAVICULAR JOINT: ICD-10-CM

## 2025-08-13 DIAGNOSIS — Z71.82 EXERCISE COUNSELING: ICD-10-CM

## 2025-08-13 PROCEDURE — 99214 OFFICE O/P EST MOD 30 MIN: CPT | Performed by: GENERAL PRACTICE

## 2025-08-13 PROCEDURE — 1125F AMNT PAIN NOTED PAIN PRSNT: CPT | Performed by: GENERAL PRACTICE

## 2025-08-13 PROCEDURE — 3075F SYST BP GE 130 - 139MM HG: CPT | Performed by: GENERAL PRACTICE

## 2025-08-13 PROCEDURE — 72148 MRI LUMBAR SPINE W/O DYE: CPT

## 2025-08-13 PROCEDURE — G2211 COMPLEX E/M VISIT ADD ON: HCPCS | Performed by: GENERAL PRACTICE

## 2025-08-13 PROCEDURE — 3078F DIAST BP <80 MM HG: CPT | Performed by: GENERAL PRACTICE

## 2025-08-13 ASSESSMENT — PAIN SCALES - GENERAL: PAINLEVEL_OUTOF10: 7=MODERATE-SEVERE PAIN

## 2025-08-13 ASSESSMENT — FIBROSIS 4 INDEX: FIB4 SCORE: 2.24

## 2025-08-14 ENCOUNTER — HOSPITAL ENCOUNTER (OUTPATIENT)
Dept: RADIOLOGY | Facility: MEDICAL CENTER | Age: 80
End: 2025-08-14
Attending: ORTHOPAEDIC SURGERY
Payer: MEDICARE

## 2025-08-14 PROCEDURE — 700117 HCHG RX CONTRAST REV CODE 255: Mod: UD | Performed by: ORTHOPAEDIC SURGERY

## 2025-08-14 PROCEDURE — 71260 CT THORAX DX C+: CPT

## 2025-08-14 RX ADMIN — IOHEXOL 100 ML: 350 INJECTION, SOLUTION INTRAVENOUS at 16:13

## 2025-08-15 ENCOUNTER — HOSPITAL ENCOUNTER (OUTPATIENT)
Dept: RADIOLOGY | Facility: MEDICAL CENTER | Age: 80
End: 2025-08-15
Payer: MEDICARE

## 2025-08-15 PROCEDURE — A9579 GAD-BASE MR CONTRAST NOS,1ML: HCPCS | Mod: JZ,UD

## 2025-08-15 PROCEDURE — 700117 HCHG RX CONTRAST REV CODE 255: Mod: JZ,UD

## 2025-08-15 PROCEDURE — 73723 MRI JOINT LWR EXTR W/O&W/DYE: CPT | Mod: RT

## 2025-08-15 RX ORDER — GADOTERIDOL 279.3 MG/ML
15 INJECTION INTRAVENOUS ONCE
Status: COMPLETED | OUTPATIENT
Start: 2025-08-15 | End: 2025-08-15

## 2025-08-15 RX ADMIN — GADOTERIDOL 15 ML: 279.3 INJECTION, SOLUTION INTRAVENOUS at 16:20

## 2025-08-26 ENCOUNTER — HOSPITAL ENCOUNTER (OUTPATIENT)
Dept: LAB | Facility: MEDICAL CENTER | Age: 80
End: 2025-08-26
Attending: ORTHOPAEDIC SURGERY
Payer: MEDICARE

## 2025-08-26 ENCOUNTER — OFFICE VISIT (OUTPATIENT)
Facility: MEDICAL CENTER | Age: 80
End: 2025-08-26
Payer: MEDICARE

## 2025-08-26 VITALS
BODY MASS INDEX: 22.43 KG/M2 | TEMPERATURE: 98.8 F | HEART RATE: 91 BPM | WEIGHT: 151.46 LBS | OXYGEN SATURATION: 96 % | HEIGHT: 69 IN | SYSTOLIC BLOOD PRESSURE: 140 MMHG | DIASTOLIC BLOOD PRESSURE: 72 MMHG

## 2025-08-26 DIAGNOSIS — K86.2 PANCREATIC CYST: Primary | ICD-10-CM

## 2025-08-26 DIAGNOSIS — R97.20 ELEVATED PSA: ICD-10-CM

## 2025-08-26 DIAGNOSIS — R19.00 INTRA-ABDOMINAL AND PELVIC SWELLING, MASS AND LUMP, UNSPECIFIED SITE: ICD-10-CM

## 2025-08-26 DIAGNOSIS — E04.1 THYROID NODULE GREATER THAN OR EQUAL TO 1.5 CM IN DIAMETER INCIDENTALLY NOTED ON IMAGING STUDY: ICD-10-CM

## 2025-08-26 LAB
BASOPHILS # BLD AUTO: 0.2 % (ref 0–1.8)
BASOPHILS # BLD: 0.03 K/UL (ref 0–0.12)
CRP SERPL HS-MCNC: 8.84 MG/DL (ref 0–0.75)
EOSINOPHIL # BLD AUTO: 0.12 K/UL (ref 0–0.51)
EOSINOPHIL NFR BLD: 1 % (ref 0–6.9)
ERYTHROCYTE [DISTWIDTH] IN BLOOD BY AUTOMATED COUNT: 43.9 FL (ref 35.9–50)
ERYTHROCYTE [SEDIMENTATION RATE] IN BLOOD BY WESTERGREN METHOD: 73 MM/HOUR (ref 0–20)
HCT VFR BLD AUTO: 39.3 % (ref 42–52)
HGB BLD-MCNC: 12.6 G/DL (ref 14–18)
IMM GRANULOCYTES # BLD AUTO: 0.09 K/UL (ref 0–0.11)
IMM GRANULOCYTES NFR BLD AUTO: 0.7 % (ref 0–0.9)
LYMPHOCYTES # BLD AUTO: 2.25 K/UL (ref 1–4.8)
LYMPHOCYTES NFR BLD: 18.6 % (ref 22–41)
MCH RBC QN AUTO: 30.6 PG (ref 27–33)
MCHC RBC AUTO-ENTMCNC: 32.1 G/DL (ref 32.3–36.5)
MCV RBC AUTO: 95.4 FL (ref 81.4–97.8)
MONOCYTES # BLD AUTO: 1.05 K/UL (ref 0–0.85)
MONOCYTES NFR BLD AUTO: 8.7 % (ref 0–13.4)
NEUTROPHILS # BLD AUTO: 8.53 K/UL (ref 1.82–7.42)
NEUTROPHILS NFR BLD: 70.8 % (ref 44–72)
NRBC # BLD AUTO: 0 K/UL
NRBC BLD-RTO: 0 /100 WBC (ref 0–0.2)
PLATELET # BLD AUTO: 387 K/UL (ref 164–446)
PMV BLD AUTO: 10 FL (ref 9–12.9)
RBC # BLD AUTO: 4.12 M/UL (ref 4.7–6.1)
WBC # BLD AUTO: 12.1 K/UL (ref 4.8–10.8)

## 2025-08-26 PROCEDURE — 84155 ASSAY OF PROTEIN SERUM: CPT

## 2025-08-26 PROCEDURE — 85025 COMPLETE CBC W/AUTO DIFF WBC: CPT

## 2025-08-26 PROCEDURE — 86140 C-REACTIVE PROTEIN: CPT

## 2025-08-26 PROCEDURE — 3078F DIAST BP <80 MM HG: CPT | Performed by: ORTHOPAEDIC SURGERY

## 2025-08-26 PROCEDURE — 84154 ASSAY OF PSA FREE: CPT

## 2025-08-26 PROCEDURE — 84165 PROTEIN E-PHORESIS SERUM: CPT

## 2025-08-26 PROCEDURE — 36415 COLL VENOUS BLD VENIPUNCTURE: CPT

## 2025-08-26 PROCEDURE — 85652 RBC SED RATE AUTOMATED: CPT

## 2025-08-26 PROCEDURE — 84153 ASSAY OF PSA TOTAL: CPT | Mod: GA

## 2025-08-26 PROCEDURE — 99213 OFFICE O/P EST LOW 20 MIN: CPT | Performed by: ORTHOPAEDIC SURGERY

## 2025-08-26 PROCEDURE — 3077F SYST BP >= 140 MM HG: CPT | Performed by: ORTHOPAEDIC SURGERY

## 2025-08-26 ASSESSMENT — FIBROSIS 4 INDEX: FIB4 SCORE: 2.24

## 2025-08-28 ENCOUNTER — TELEPHONE (OUTPATIENT)
Facility: MEDICAL CENTER | Age: 80
End: 2025-08-28
Payer: MEDICARE

## 2025-08-28 LAB
PSA FREE MFR SERPL: 21 %
PSA FREE SERPL-MCNC: 1.2 NG/ML
PSA SERPL-MCNC: 5.6 NG/ML (ref 0–4)

## 2025-08-28 ASSESSMENT — ENCOUNTER SYMPTOMS
WEIGHT LOSS: 0
SHORTNESS OF BREATH: 0
WEAKNESS: 0
FEVER: 0
SENSORY CHANGE: 0
CHILLS: 0
MYALGIAS: 0

## 2025-08-29 ENCOUNTER — OFFICE VISIT (OUTPATIENT)
Dept: PHYSICAL MEDICINE AND REHAB | Facility: MEDICAL CENTER | Age: 80
End: 2025-08-29
Payer: MEDICARE

## 2025-08-29 VITALS
WEIGHT: 151 LBS | SYSTOLIC BLOOD PRESSURE: 142 MMHG | HEART RATE: 89 BPM | OXYGEN SATURATION: 97 % | DIASTOLIC BLOOD PRESSURE: 74 MMHG | HEIGHT: 69 IN | BODY MASS INDEX: 22.36 KG/M2 | TEMPERATURE: 98.4 F

## 2025-08-29 DIAGNOSIS — M16.0 ARTHRITIS OF BOTH HIPS: ICD-10-CM

## 2025-08-29 DIAGNOSIS — M19.011 GLENOHUMERAL ARTHRITIS, RIGHT: ICD-10-CM

## 2025-08-29 DIAGNOSIS — M79.10 MYALGIA: ICD-10-CM

## 2025-08-29 DIAGNOSIS — Z71.82 EXERCISE COUNSELING: ICD-10-CM

## 2025-08-29 DIAGNOSIS — M54.16 LUMBAR RADICULOPATHY: ICD-10-CM

## 2025-08-29 DIAGNOSIS — M47.9 SPONDYLOSIS: ICD-10-CM

## 2025-08-29 DIAGNOSIS — M62.9 HAMSTRING TIGHTNESS OF BOTH LOWER EXTREMITIES: ICD-10-CM

## 2025-08-29 DIAGNOSIS — M47.816 FACET ARTHRITIS OF LUMBAR REGION: ICD-10-CM

## 2025-08-29 DIAGNOSIS — Z91.81 RISK FOR FALLS: ICD-10-CM

## 2025-08-29 DIAGNOSIS — M19.012 GLENOHUMERAL ARTHRITIS, LEFT: Primary | ICD-10-CM

## 2025-08-29 DIAGNOSIS — M43.16 SPONDYLOLISTHESIS OF LUMBAR REGION: ICD-10-CM

## 2025-08-29 DIAGNOSIS — M19.011 ARTHRITIS OF RIGHT ACROMIOCLAVICULAR JOINT: ICD-10-CM

## 2025-08-29 DIAGNOSIS — M19.012 ARTHRITIS OF LEFT ACROMIOCLAVICULAR JOINT: ICD-10-CM

## 2025-08-29 LAB
ALBUMIN SERPL ELPH-MCNC: 3.01 G/DL (ref 3.75–5.01)
ALPHA1 GLOB SERPL ELPH-MCNC: 0.51 G/DL (ref 0.19–0.46)
ALPHA2 GLOB SERPL ELPH-MCNC: 1.33 G/DL (ref 0.48–1.05)
B-GLOBULIN SERPL ELPH-MCNC: 1.07 G/DL (ref 0.48–1.1)
GAMMA GLOB SERPL ELPH-MCNC: 1.08 G/DL (ref 0.62–1.51)
INTERPRETATION SERPL IFE-IMP: ABNORMAL
MONOCLON BAND OBS SERPL: ABNORMAL
MONOCLONAL PROTEIN NL11656: ABNORMAL G/DL
PATHOLOGY STUDY: ABNORMAL
PROT SERPL-MCNC: 7 G/DL (ref 6.3–8.2)

## 2025-08-29 RX ORDER — DEXAMETHASONE SODIUM PHOSPHATE 4 MG/ML
4 INJECTION, SOLUTION INTRA-ARTICULAR; INTRALESIONAL; INTRAMUSCULAR; INTRAVENOUS; SOFT TISSUE ONCE
Status: COMPLETED | OUTPATIENT
Start: 2025-08-29 | End: 2025-08-29

## 2025-08-29 RX ADMIN — DEXAMETHASONE SODIUM PHOSPHATE 4 MG: 4 INJECTION, SOLUTION INTRA-ARTICULAR; INTRALESIONAL; INTRAMUSCULAR; INTRAVENOUS; SOFT TISSUE at 14:49

## 2025-08-29 RX ADMIN — Medication 9 ML: at 14:49

## 2025-08-29 ASSESSMENT — PAIN SCALES - GENERAL: PAINLEVEL_OUTOF10: 8=MODERATE-SEVERE PAIN

## 2025-08-29 ASSESSMENT — FIBROSIS 4 INDEX: FIB4 SCORE: 0.81

## 2025-08-30 ENCOUNTER — HOSPITAL ENCOUNTER (OUTPATIENT)
Dept: LAB | Facility: MEDICAL CENTER | Age: 80
End: 2025-08-30
Attending: ORTHOPAEDIC SURGERY
Payer: MEDICARE

## 2025-08-30 LAB
ANION GAP SERPL CALC-SCNC: 13 MMOL/L (ref 7–16)
BUN SERPL-MCNC: 25 MG/DL (ref 8–22)
CALCIUM SERPL-MCNC: 9.6 MG/DL (ref 8.5–10.5)
CHLORIDE SERPL-SCNC: 104 MMOL/L (ref 96–112)
CO2 SERPL-SCNC: 21 MMOL/L (ref 20–33)
CREAT SERPL-MCNC: 1.05 MG/DL (ref 0.5–1.4)
GFR SERPLBLD CREATININE-BSD FMLA CKD-EPI: 73 ML/MIN/1.73 M 2
GLUCOSE SERPL-MCNC: 188 MG/DL (ref 65–99)
POTASSIUM SERPL-SCNC: 5 MMOL/L (ref 3.6–5.5)
SODIUM SERPL-SCNC: 138 MMOL/L (ref 135–145)

## 2025-08-30 PROCEDURE — 36415 COLL VENOUS BLD VENIPUNCTURE: CPT

## 2025-08-30 PROCEDURE — 80048 BASIC METABOLIC PNL TOTAL CA: CPT

## 2025-09-15 ENCOUNTER — APPOINTMENT (OUTPATIENT)
Dept: RADIOLOGY | Facility: MEDICAL CENTER | Age: 80
End: 2025-09-15
Payer: MEDICARE

## 2025-09-16 ENCOUNTER — APPOINTMENT (OUTPATIENT)
Dept: MEDICAL GROUP | Facility: CLINIC | Age: 80
End: 2025-09-16
Payer: MEDICARE

## (undated) DEVICE — ELECTRODE DUAL RETURN W/ CORD - (50/PK)

## (undated) DEVICE — BLADE SURGICAL CLIPPER - (50EA/CA)

## (undated) DEVICE — TUBING INSUFFLATION - (10/BX)

## (undated) DEVICE — SODIUM CHL IRRIGATION 0.9% 1000ML (12EA/CA)

## (undated) DEVICE — TUBING CLEARLINK DUO-VENT - C-FLO (48EA/CA)

## (undated) DEVICE — MASK ANESTHESIA ADULT  - (100/CA)

## (undated) DEVICE — NEPTUNE 4 PORT MANIFOLD - (20/PK)

## (undated) DEVICE — KIT ANESTHESIA W/CIRCUIT & 3/LT BAG W/FILTER (20EA/CA)

## (undated) DEVICE — WATER IRRIGATION STERILE 1000ML (12EA/CA)

## (undated) DEVICE — LACTATED RINGERS INJ 1000 ML - (14EA/CA 60CA/PF)

## (undated) DEVICE — BAG RETRIEVAL 10ML (10EA/BX)

## (undated) DEVICE — SUTURE 0 COATED VICRYL 6-18IN - (12PK/BX)

## (undated) DEVICE — SUTURE 4-0 VICRYL PLUSFS-1 - 27 INCH (36/BX)

## (undated) DEVICE — SUCTION INSTRUMENT YANKAUER BULBOUS TIP W/O VENT (50EA/CA)

## (undated) DEVICE — SUTURE 0 VICRYL PLUS UR-6 - 27 INCH (36/BX)

## (undated) DEVICE — SENSOR SPO2 NEO LNCS ADHESIVE (20/BX) SEE USER NOTES

## (undated) DEVICE — SYSTEM DISSECTION BALLOON  KII OVAL WITH 2 EACH 5MM LOW PROFILE TROCARS

## (undated) DEVICE — PACK LAP CHOLE OR - (2EA/CA)

## (undated) DEVICE — SET EXTENSION WITH 2 PORTS (48EA/CA) ***PART #2C8610 IS A SUBSTITUTE*****

## (undated) DEVICE — TROCAR LAPSCP 100MM 12MM NTHRD - (6/BX)

## (undated) DEVICE — DRESSING TRANSPARENT FILM TEGADERM 2.375 X 2.75"  (100EA/BX)"

## (undated) DEVICE — SET LEADWIRE 5 LEAD BEDSIDE DISPOSABLE ECG (1SET OF 5/EA)

## (undated) DEVICE — SUTURE GENERAL

## (undated) DEVICE — SLEEVE, VASO, THIGH, MED

## (undated) DEVICE — PROTECTOR ULNA NERVE - (36PR/CA)

## (undated) DEVICE — CHLORAPREP 26 ML APPLICATOR - ORANGE TINT(25/CA)

## (undated) DEVICE — HEAD HOLDER JUNIOR/ADULT

## (undated) DEVICE — DRAPESURG STERI-DRAPE LONG - (10/BX 4BX/CA)

## (undated) DEVICE — ELECTRODE 850 FOAM ADHESIVE - HYDROGEL RADIOTRNSPRNT (50/PK)

## (undated) DEVICE — TROCAR Z THREAD 11 X 100 - BLADED (6/BX)

## (undated) DEVICE — SPONGE GAUZESTER. 2X2 4-PL - (2/PK 50PK/BX 30BX/CS)

## (undated) DEVICE — GOWN WARMING STANDARD FLEX - (30/CA)

## (undated) DEVICE — CANISTER SUCTION 3000ML MECHANICAL FILTER AUTO SHUTOFF MEDI-VAC NONSTERILE LF DISP  (40EA/CA)

## (undated) DEVICE — DERMABOND ADVANCED - (12EA/BX)

## (undated) DEVICE — SUTURE 3-0 30 CM X 30 CM STRATAFIX SPRIAL PS-1 NEEDLE (12/BX)